# Patient Record
Sex: FEMALE | Race: WHITE | NOT HISPANIC OR LATINO
[De-identification: names, ages, dates, MRNs, and addresses within clinical notes are randomized per-mention and may not be internally consistent; named-entity substitution may affect disease eponyms.]

---

## 2018-05-16 ENCOUNTER — RESULT REVIEW (OUTPATIENT)
Age: 43
End: 2018-05-16

## 2018-05-23 ENCOUNTER — ASOB RESULT (OUTPATIENT)
Age: 43
End: 2018-05-23

## 2018-05-23 ENCOUNTER — APPOINTMENT (OUTPATIENT)
Dept: ANTEPARTUM | Facility: CLINIC | Age: 43
End: 2018-05-23
Payer: COMMERCIAL

## 2018-05-23 PROCEDURE — 76857 US EXAM PELVIC LIMITED: CPT | Mod: 59

## 2018-05-23 PROCEDURE — 76830 TRANSVAGINAL US NON-OB: CPT

## 2020-08-12 ENCOUNTER — RESULT REVIEW (OUTPATIENT)
Age: 45
End: 2020-08-12

## 2020-08-19 ENCOUNTER — APPOINTMENT (OUTPATIENT)
Dept: DERMATOLOGY | Facility: CLINIC | Age: 45
End: 2020-08-19
Payer: COMMERCIAL

## 2020-08-19 DIAGNOSIS — L63.9 ALOPECIA AREATA, UNSPECIFIED: ICD-10-CM

## 2020-08-19 PROCEDURE — 99203 OFFICE O/P NEW LOW 30 MIN: CPT

## 2020-08-19 RX ORDER — LEVOTHYROXINE SODIUM 137 UG/1
TABLET ORAL
Refills: 0 | Status: ACTIVE | COMMUNITY

## 2020-08-19 NOTE — HISTORY OF PRESENT ILLNESS
[FreeTextEntry1] : Hair loss [de-identified] : First visit for 45 year old white female presenting with a two-week history of marked hair loss on the crown of the scalp. No previous episodes. No previous treatment.\par Note: Patient with history of hypothyroidism on Synthroid.

## 2020-08-19 NOTE — PHYSICAL EXAM
[Oriented x 3] : ~L oriented x 3 [Alert] : alert [Well Nourished] : well nourished [FreeTextEntry3] : Type II skin\par \par Patient wearing a facemask\par \par Crown of scalp: 5 x 4 cm focus of almost total care loss\par Smaller similar areas present on the right crown of scalp, left parietal scalp\par Multiple broken hairs seen on dermoscopy\par One small exclamation point hairs\par Very positive hair pull\par \par Fingernails without pitting

## 2020-09-23 ENCOUNTER — APPOINTMENT (OUTPATIENT)
Dept: DERMATOLOGY | Facility: CLINIC | Age: 45
End: 2020-09-23

## 2023-04-03 ENCOUNTER — APPOINTMENT (OUTPATIENT)
Dept: FAMILY MEDICINE | Facility: CLINIC | Age: 48
End: 2023-04-03
Payer: COMMERCIAL

## 2023-04-03 ENCOUNTER — NON-APPOINTMENT (OUTPATIENT)
Age: 48
End: 2023-04-03

## 2023-04-03 ENCOUNTER — APPOINTMENT (OUTPATIENT)
Dept: ORTHOPEDIC SURGERY | Facility: CLINIC | Age: 48
End: 2023-04-03
Payer: OTHER MISCELLANEOUS

## 2023-04-03 VITALS
HEART RATE: 80 BPM | WEIGHT: 130 LBS | BODY MASS INDEX: 23.92 KG/M2 | TEMPERATURE: 98.8 F | HEIGHT: 62 IN | SYSTOLIC BLOOD PRESSURE: 130 MMHG | DIASTOLIC BLOOD PRESSURE: 60 MMHG | OXYGEN SATURATION: 95 %

## 2023-04-03 VITALS — HEIGHT: 62 IN | WEIGHT: 130 LBS | BODY MASS INDEX: 23.92 KG/M2

## 2023-04-03 DIAGNOSIS — Z00.00 ENCOUNTER FOR GENERAL ADULT MEDICAL EXAMINATION W/OUT ABNORMAL FINDINGS: ICD-10-CM

## 2023-04-03 DIAGNOSIS — E03.9 HYPOTHYROIDISM, UNSPECIFIED: ICD-10-CM

## 2023-04-03 DIAGNOSIS — Z78.9 OTHER SPECIFIED HEALTH STATUS: ICD-10-CM

## 2023-04-03 DIAGNOSIS — S92.902A UNSPECIFIED FRACTURE OF LEFT FOOT, INITIAL ENCOUNTER FOR CLOSED FRACTURE: ICD-10-CM

## 2023-04-03 DIAGNOSIS — Z83.49 FAMILY HISTORY OF OTHER ENDOCRINE, NUTRITIONAL AND METABOLIC DISEASES: ICD-10-CM

## 2023-04-03 LAB
BILIRUB UR QL STRIP: NORMAL
CLARITY UR: CLEAR
COLLECTION METHOD: NORMAL
GLUCOSE UR-MCNC: NORMAL
HCG UR QL: 0.2 EU/DL
HGB UR QL STRIP.AUTO: NORMAL
KETONES UR-MCNC: NORMAL
LEUKOCYTE ESTERASE UR QL STRIP: NORMAL
NITRITE UR QL STRIP: NORMAL
PH UR STRIP: 6.5
PROT UR STRIP-MCNC: NORMAL
SP GR UR STRIP: 1

## 2023-04-03 PROCEDURE — 92552 PURE TONE AUDIOMETRY AIR: CPT

## 2023-04-03 PROCEDURE — 93000 ELECTROCARDIOGRAM COMPLETE: CPT

## 2023-04-03 PROCEDURE — 73630 X-RAY EXAM OF FOOT: CPT | Mod: LT

## 2023-04-03 PROCEDURE — 99204 OFFICE O/P NEW MOD 45 MIN: CPT

## 2023-04-03 PROCEDURE — 99386 PREV VISIT NEW AGE 40-64: CPT | Mod: 25

## 2023-04-03 PROCEDURE — 81003 URINALYSIS AUTO W/O SCOPE: CPT | Mod: QW

## 2023-04-03 PROCEDURE — 36415 COLL VENOUS BLD VENIPUNCTURE: CPT

## 2023-04-03 NOTE — PHYSICAL EXAM
[No Acute Distress] : no acute distress [Well Nourished] : well nourished [Well Developed] : well developed [Well-Appearing] : well-appearing [Normal Sclera/Conjunctiva] : normal sclera/conjunctiva [PERRL] : pupils equal round and reactive to light [EOMI] : extraocular movements intact [Normal Outer Ear/Nose] : the outer ears and nose were normal in appearance [Normal Oropharynx] : the oropharynx was normal [No JVD] : no jugular venous distention [No Lymphadenopathy] : no lymphadenopathy [Supple] : supple [Thyroid Normal, No Nodules] : the thyroid was normal and there were no nodules present [No Respiratory Distress] : no respiratory distress  [No Accessory Muscle Use] : no accessory muscle use [Clear to Auscultation] : lungs were clear to auscultation bilaterally [Normal Rate] : normal rate  [Regular Rhythm] : with a regular rhythm [Normal S1, S2] : normal S1 and S2 [No Murmur] : no murmur heard [No Carotid Bruits] : no carotid bruits [No Abdominal Bruit] : a ~M bruit was not heard ~T in the abdomen [No Varicosities] : no varicosities [Pedal Pulses Present] : the pedal pulses are present [No Edema] : there was no peripheral edema [No Palpable Aorta] : no palpable aorta [No Extremity Clubbing/Cyanosis] : no extremity clubbing/cyanosis [Soft] : abdomen soft [Non Tender] : non-tender [Non-distended] : non-distended [No Masses] : no abdominal mass palpated [No HSM] : no HSM [Normal Bowel Sounds] : normal bowel sounds [Normal Posterior Cervical Nodes] : no posterior cervical lymphadenopathy [Normal Anterior Cervical Nodes] : no anterior cervical lymphadenopathy [No CVA Tenderness] : no CVA  tenderness [No Spinal Tenderness] : no spinal tenderness [No Joint Swelling] : no joint swelling [Grossly Normal Strength/Tone] : grossly normal strength/tone [No Rash] : no rash [Coordination Grossly Intact] : coordination grossly intact [No Focal Deficits] : no focal deficits [Normal Gait] : normal gait [Deep Tendon Reflexes (DTR)] : deep tendon reflexes were 2+ and symmetric [Normal Affect] : the affect was normal [Normal Insight/Judgement] : insight and judgment were intact [FreeTextEntry1] : .\par Right Ear\par \par 0.5-35\par 1-20\par 2-20\par 4-20\par \par Left Ear\par \par 0.5-25\par 1-25\par 2-20\par 4-20\par

## 2023-04-03 NOTE — DISCUSSION/SUMMARY
[de-identified] : Today I had a lengthy discussion with the patient regarding their left Metatarsal fracture/pain. I have addressed all the patient's concerns surrounding the pathology of their condition. XR imaging obtained previously at an urgent care was reviewed and discussed in detail  in office today. MRI imaging was discussed and interpreted by me today in office. Discussed with the patient conservative and surgical treatment options. I recommend that the patient utilize 50,000 units of vitamin D. A prescription was given in the office today. I recommended the patient utilize bone stimulator treatment and discussed all associated risks in detail. I encouraged the patient to quit smoking and educated them about the risks that smoking has on bone healing. A discussion was had about utilizing custom orthotics. The patient was educated about custom orthotics in the office today. I recommend that the patient utilize ice, NSAIDS PRN, and heat. They can also elevate their left foot above the level of the heart. \par \par \par I recommend the patient follow up 4-6 weeks to reassess their condition. \par \par The patient understood and verbally agreed to the treatment plan. All of their questions were answered and they were satisfied with the visit. The patient should call the office if they have any questions or experience worsening symptoms.

## 2023-04-03 NOTE — PLAN
[FreeTextEntry1] : Blood work done in office today. Will follow up on results with patient.\par Extensive counseling provided on lifestyle modifications (healthy diet, daily exercise, routine screenings). \par Return for CPE in 1 year.\par \par DEXA 2 weeks prior \par Mammogram recently done 3/22/23, followed by biopsy - DCIS - scheduled to see breast onc end of 4/23 with [facility] in [place]. \par \par GI referral given for colonoscopy. \par Sees derm routinely.

## 2023-04-03 NOTE — HISTORY OF PRESENT ILLNESS
[FreeTextEntry1] : Patient is a 47 year old, right hand dominant female who presents in office status post work related injury on 06/06/2022. The patient works as a Teacher in [employer]. During the injury the patient injured her left foot. The patient states that she was at field day and was dancing with her class on back-top, stepped down and felt a pop thorough out the lateral side of her left foot. She initially went to urgent care and followed up with [facility]. The patient states that she went throughout conservative treatment (physical therapy, CAM boot) with feeling minimal relief. She is here seeking a second opinion. She endorses lateral foot, big toe, 2nd and 3rd toe pain. She does experiences some numbness into all of her toes. She presents today jalil HOKA sneakers and is ambulating without assistance. No other complaints.\par \par Pain scale: 6/10\par \par Working: The patient is currently working\par Driving: The patient is currently driving\par \par Activities that make the pain better:\par Rest\par Ice\par Heat\par \par Activities that make pain worse:\par ADL's\par Lifting heavy objects\par Chores\par Work\par Sleeping\par Stairs\par Seating to standing position\par Walking\par

## 2023-04-03 NOTE — HEALTH RISK ASSESSMENT
[Yes] : Yes [2 - 4 times a month (2 pts)] : 2-4 times a month (2 points) [1 or 2 (0 pts)] : 1 or 2 (0 points) [Never (0 pts)] : Never (0 points) [No] : In the past 12 months have you used drugs other than those required for medical reasons? No [No falls in past year] : Patient reported no falls in the past year [0] : 2) Feeling down, depressed, or hopeless: Not at all (0) [PHQ-2 Negative - No further assessment needed] : PHQ-2 Negative - No further assessment needed [Patient reported mammogram was abnormal] : Patient reported mammogram was abnormal [Patient reported PAP Smear was normal] : Patient reported PAP Smear was normal [HIV test declined] : HIV test declined [Hepatitis C test declined] : Hepatitis C test declined [With Family] : lives with family [# of Members in Household ___] :  household currently consist of [unfilled] member(s) [Employed] : employed [College] : College [] :  [# Of Children ___] : has [unfilled] children [Sexually Active] : sexually active [Feels Safe at Home] : Feels safe at home [Fully functional (bathing, dressing, toileting, transferring, walking, feeding)] : Fully functional (bathing, dressing, toileting, transferring, walking, feeding) [Fully functional (using the telephone, shopping, preparing meals, housekeeping, doing laundry, using] : Fully functional and needs no help or supervision to perform IADLs (using the telephone, shopping, preparing meals, housekeeping, doing laundry, using transportation, managing medications and managing finances) [Reports changes in vision] : Reports changes in vision [Reports normal functional visual acuity (ie: able to read med bottle)] : Reports normal functional visual acuity [Smoke Detector] : smoke detector [Carbon Monoxide Detector] : carbon monoxide detector [Safety elements used in home] : safety elements used in home [Seat Belt] :  uses seat belt [Sunscreen] : uses sunscreen [Audit-CScore] : 2 [[id number]] : 0 [Change in mental status noted] : No change in mental status noted [Language] : denies difficulty with language [Behavior] : denies difficulty with behavior [Learning/Retaining New Information] : denies difficulty learning/retaining new information [Handling Complex Tasks] : denies difficulty handling complex tasks [Reasoning] : denies difficulty with reasoning [Spatial Ability and Orientation] : denies difficulty with spatial ability and orientation [Reports changes in hearing] : Reports no changes in hearing [Reports changes in dental health] : Reports no changes in dental health [Guns at Home] : no guns at home [Travel to Developing Areas] : does not  travel to developing areas [TB Exposure] : is not being exposed to tuberculosis [Caregiver Concerns] : does not have caregiver concerns [MammogramDate] : 03/23 [PapSmearDate] : 02/22 [FreeTextEntry2] : teacher

## 2023-04-03 NOTE — ADDENDUM
[FreeTextEntry1] : I, MILLI HAYS, acted solely as a scribe for Dr. Jcarlos Watson on this date 04/03/2023  .\par  \par All medical record entries made by the Scribe were at my, Dr. Jcarlos Watson, direction and personally dictated by me on 04/03/2023 . I have reviewed the chart and agree that the record accurately reflects my personal performance of the history, physical exam, assessment and plan. I have also personally directed, reviewed, and agreed with the chart.

## 2023-04-03 NOTE — PHYSICAL EXAM
[de-identified] : Left Foot Exam\par \par Skin: Clean, dry, intact\par Inspection: No obvious malalignment, no masses, no swelling, no effusion\par Pulses: 2+ DP/PT pulses\par ROM: LEFT FOOT ROM of 1st digit : 50 degrees of dorsiflexion, 40 degrees of plantarflexion\par Painful ROM: None\par Tenderness: + Base of the fifth metatarsal tenderness, + Tenderness to 1st MTP joint, No tenderness over the medial malleolus, No tenderness over the lateral malleolus, no CFL/ATFL/PTFL pain, no deltoid ligament pain. No heel pain. No Achilles tenderness. No pain to the LisFranc joint. No ttp over the posterior tibial tendon.\par Stability: Negative anterior/posterior drawer.\par Strength: 5/5 ADD/ABD/TA/GS/EHL/FHL/EDL\par Neuro: Sensation in tact to light touch throughout\par Additional tests: Negative Mortons test, negative tarsal tunnel tinels, negative single heel rise.  [de-identified] : MRI-LEFT FOOT NON CONTRAST\par \par HISTORY: Left foot pain\par \par TECHNIQUE: MR imaging of the left foot was performed without IV contrast on a\par 3.0 Francine Ultra High Field Wide Bore MRI unit utilizing the following pulse\par sequences: Short axis proton density with and without fat suppression, long axis\par proton density fat-suppressed, and sagittal proton density.\par \par COMPARISON: No prior studies are available for comparison..\par \par FINDINGS:\par \par Bones/joints: There is bone marrow edema in the first metatarsal head with\par subcortical cysts likely residual previous microtrabecular fracture... There is\par bone marrow edema in the junction of the proximal shaft and base of the fifth\par metatarsal suggestive of a stress reaction and may represent residua of previous\par fracture. Correlate with clinical exam and consider CT for further assessment..\par \par Tendons/muscles: The flexor and extensor tendons are intact without evidence for\par tendinosis, tear, or tenosynovitis. There is no disproportionate muscle atrophy\par or intramuscular edema.\par The visualized portions of the plantar fascia are intact.\par \par Ligaments: The Lisfranc ligament is intact. There is no acute full-thickness\par plantar plate tear.\par \par Webspaces: No webspace neuroma or intermetatarsal bursitis is appreciated.\par \par Subcutaneous tissues: The subcutaneous tissues are within normal limits.\par \par IMPRESSION:\par \par 1. Bone marrow edema in the first metatarsal head with subcortical cysts likely\par residua of previous microtrabecular fracture.\par 2. Bone marrow edema in the junction of the proximal shaft and base of the\par fifth metatarsal suggestive of a stress reaction and may represent residua of\par previous fracture. Correlate with clinical exam and consider CT for further\par assessment.\par \par Signed by: Rafael Villafuerte MD\par Signed Date: 3/23/2023 11:55 AM EDT\par \par \par \par SIGNED BY: Rafael Villafuerte M.D., Ext. [phone] 03/23/2023 11:55 AM

## 2023-04-06 ENCOUNTER — TRANSCRIPTION ENCOUNTER (OUTPATIENT)
Age: 48
End: 2023-04-06

## 2023-04-07 ENCOUNTER — TRANSCRIPTION ENCOUNTER (OUTPATIENT)
Age: 48
End: 2023-04-07

## 2023-05-01 ENCOUNTER — APPOINTMENT (OUTPATIENT)
Dept: PLASTIC SURGERY | Facility: CLINIC | Age: 48
End: 2023-05-01
Payer: COMMERCIAL

## 2023-05-01 VITALS
WEIGHT: 132 LBS | DIASTOLIC BLOOD PRESSURE: 82 MMHG | HEIGHT: 62 IN | BODY MASS INDEX: 24.29 KG/M2 | HEART RATE: 73 BPM | SYSTOLIC BLOOD PRESSURE: 140 MMHG | OXYGEN SATURATION: 97 % | RESPIRATION RATE: 17 BRPM

## 2023-05-01 DIAGNOSIS — Z42.1 ENCOUNTER FOR BREAST RECONSTRUCTION FOLLOWING MASTECTOMY: ICD-10-CM

## 2023-05-01 DIAGNOSIS — F17.210 NICOTINE DEPENDENCE, CIGARETTES, UNCOMPLICATED: ICD-10-CM

## 2023-05-01 DIAGNOSIS — Z82.49 FAMILY HISTORY OF ISCHEMIC HEART DISEASE AND OTHER DISEASES OF THE CIRCULATORY SYSTEM: ICD-10-CM

## 2023-05-01 DIAGNOSIS — Z80.8 FAMILY HISTORY OF MALIGNANT NEOPLASM OF OTHER ORGANS OR SYSTEMS: ICD-10-CM

## 2023-05-01 PROCEDURE — 99205 OFFICE O/P NEW HI 60 MIN: CPT

## 2023-05-01 RX ORDER — PREDNISONE 10 MG/1
10 TABLET ORAL
Qty: 70 | Refills: 0 | Status: DISCONTINUED | COMMUNITY
Start: 2020-08-19 | End: 2023-05-01

## 2023-05-10 ENCOUNTER — APPOINTMENT (OUTPATIENT)
Dept: RADIATION ONCOLOGY | Facility: CLINIC | Age: 48
End: 2023-05-10
Payer: COMMERCIAL

## 2023-05-10 ENCOUNTER — NON-APPOINTMENT (OUTPATIENT)
Age: 48
End: 2023-05-10

## 2023-05-10 VITALS
DIASTOLIC BLOOD PRESSURE: 74 MMHG | BODY MASS INDEX: 24.11 KG/M2 | RESPIRATION RATE: 18 BRPM | HEART RATE: 69 BPM | HEIGHT: 62 IN | WEIGHT: 131 LBS | SYSTOLIC BLOOD PRESSURE: 123 MMHG

## 2023-05-10 DIAGNOSIS — Z80.8 FAMILY HISTORY OF MALIGNANT NEOPLASM OF OTHER ORGANS OR SYSTEMS: ICD-10-CM

## 2023-05-10 DIAGNOSIS — Z86.39 PERSONAL HISTORY OF OTHER ENDOCRINE, NUTRITIONAL AND METABOLIC DISEASE: ICD-10-CM

## 2023-05-10 PROCEDURE — 99204 OFFICE O/P NEW MOD 45 MIN: CPT

## 2023-05-10 NOTE — OB/GYN HISTORY
[History of Birth Control Pills] : Patient has a history of taking birth control pills [Menopause Age: ____] : patient was [unfilled] years old at menopause [___] : Living: [unfilled]

## 2023-05-10 NOTE — REVIEW OF SYSTEMS
[Insomnia] : insomnia [Anxiety] : anxiety [Negative] : Allergic/Immunologic [FreeTextEntry9] : foot pain from healed fracture

## 2023-05-10 NOTE — VITALS
[Maximal Pain Intensity: 0/10] : 0/10 [NoTreatment Scheduled] : no treatment scheduled [90: Able to carry normal activity; minor signs or symptoms of disease.] : 90: Able to carry normal activity; minor signs or symptoms of disease.  [Date: ____________] : Patient's last distress assessment performed on [unfilled]. [8 - Distress Level] : Distress Level: 8 [Referred Patient  to social work for follow-up] : Patient was referred to social work for follow-up [Patient given social work contact information and resource sheet] : Patient was given social work contact information and resource sheet

## 2023-05-10 NOTE — PHYSICAL EXAM
[Symmetric] : breasts are symmetric [Breast Palpation Mass] : no palpable masses [No UE Edema] : there is no upper extremity edema [Normal] : oriented to person, place and time, the affect was normal, the mood was normal and not anxious [de-identified] : c cups. ecchymosis L lateral breast

## 2023-05-10 NOTE — HISTORY OF PRESENT ILLNESS
[FreeTextEntry1] : The patient is a pleasant 47 year old female diagnosed with DCIS of the left breast referred by Dr. Fontaine.\par She had an abnormal routine screening mammogram which revealed heterogeneously dense breast, right breast without suspicious findings. Left breast- group of calcifications in the posterior upper left breast, 7.3 cm FN. Additional mammographic views of the left breast obtained the same day, demonstrate a mixture of calcifications with coarse heterogeneous and pleomorphic morphology, measuring 0.7 x 0.5 x 0.7 cm.\par 3/28/23 stereotactic biopsy of calcifications in the posterior upper outer left breast. 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  LCIS classic type,  ADH and ALH present.  %, IL 70%positive.\par \par Left partial mastectomy is scheduled for 5/12/23 with Dr. Fontaine.\par [employer] negative on 4/12/23.She works full time as a  in [employer]. She has 2 sons ages 18 and 16. She exercises regularly with strenght training (beach body).\par She presents to discuss therapy options.

## 2023-05-15 ENCOUNTER — APPOINTMENT (OUTPATIENT)
Dept: ORTHOPEDIC SURGERY | Facility: CLINIC | Age: 48
End: 2023-05-15
Payer: OTHER MISCELLANEOUS

## 2023-05-15 ENCOUNTER — FORM ENCOUNTER (OUTPATIENT)
Age: 48
End: 2023-05-15

## 2023-05-15 PROCEDURE — 73630 X-RAY EXAM OF FOOT: CPT | Mod: LT

## 2023-05-15 PROCEDURE — 99213 OFFICE O/P EST LOW 20 MIN: CPT

## 2023-05-15 NOTE — DISCUSSION/SUMMARY
[de-identified] : Today I had a lengthy discussion with the patient regarding their left Metatarsal fracture/pain. I have addressed all the patient's concerns surrounding the pathology of their condition. XR imaging obtained today was reviewed and discussed in detail  in office today. Discussed with the patient conservative treatment options. I recommend the patient undergo a course of physical therapy for the X  2-3 times a week for a total of 6-8 weeks. A prescription was given for the physical therapy today.  I recommend that the patient to utilize 50,000 units of vitamin D. I recommend that the patient utilize Voltaren gel topically. If the Voltaren gel could not be obtained, Icy Hot, Biofreeze, or Bengay can be utilized instead.  I encouraged the patient to quit smoking and educated them about the risks that smoking has on bone healing. I recommend that the patient utilize a stiff shoe. The patient was provided with the stiff shoe in the office today.  I recommend that the patient utilize ice, NSAIDS PRN, and heat. They can also elevate their left foot above the level of the heart. \par \par \par I recommend the patient follow up 4-6 weeks to reassess their condition.  If there is on resolution of symptoms upon the next visit than an MRI may be necessary. \par \par The patient understood and verbally agreed to the treatment plan. All of their questions were answered and they were satisfied with the visit. The patient should call the office if they have any questions or experience worsening symptoms. \par \par She is currently full time working without limitations.

## 2023-05-15 NOTE — ADDENDUM
[FreeTextEntry1] : I, MILLI HAYS, acted solely as a scribe for Dr. Jcarlos Watson on this date 05/15/2023  .\par  \par All medical record entries made by the Scribe were at my, Dr. Jcarlos Watson, direction and personally dictated by me on 05/15/2023 . I have reviewed the chart and agree that the record accurately reflects my personal performance of the history, physical exam, assessment and plan. I have also personally directed, reviewed, and agreed with the chart.

## 2023-05-15 NOTE — PHYSICAL EXAM
[de-identified] : Left Foot Exam\par \par Skin: Clean, dry, intact\par Inspection: No obvious malalignment, no masses, no swelling, no effusion\par Pulses: 2+ DP/PT pulses\par ROM: LEFT FOOT ROM of 1st digit : 50 degrees of dorsiflexion, 40 degrees of plantarflexion\par Painful ROM: None\par Tenderness: + lateral pain/burning, + Base of the fifth metatarsal tenderness, + Tenderness to 1st MTP joint, No tenderness over the medial malleolus, No tenderness over the lateral malleolus, no CFL/ATFL/PTFL pain, no deltoid ligament pain. No heel pain. No Achilles tenderness. No pain to the LisFranc joint. No ttp over the posterior tibial tendon.\par Stability: Negative anterior/posterior drawer.\par Strength: 5/5 ADD/ABD/TA/GS/EHL/FHL/EDL\par Neuro: Sensation in tact to light touch throughout\par Additional tests: Negative Mortons test, negative tarsal tunnel tinels, negative single heel rise.  [de-identified] : 3V of the left foot were ordered obtained and reviewed by me today, 05/15/2023 , revealed: Concern for stress fracture to base of fifth Metatarsal.

## 2023-05-15 NOTE — HISTORY OF PRESENT ILLNESS
[FreeTextEntry1] : 05/15/23: Patient is a 47 year old, right hand dominant female who presents in office for follow up evaluation of work related injury. She reports that her pain has remained the same since the last visit. She describes the pain as a constant burning sensation to the lateral plantar aspect of her left foot. She states that she was not able to utilize the orthotics and the bone stimulator. She presents today wearing sneakers and is ambulating without assistance. She reports that she had a bone density scan in march which did not show any abnormality. \par \par 4/03/23:Patient is a 47 year old, right hand dominant female who presents in office status post work related injury on 06/06/2022. 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 The patient states that she was at field day and was dancing with her class on back-top, stepped down and felt a pop thorough out the lateral side of her left foot. She initially went to urgent care and followed up with [facility]. The patient states that she went throughout conservative treatment (physical therapy, CAM boot) with feeling minimal relief. She is here seeking a second opinion. She endorses lateral foot, big toe, 2nd and 3rd toe pain. She does experiences some numbness into all of her toes. She presents today jalil HOKA sneakers and is ambulating without assistance. No other complaints.\par \par Pain scale: 6/10\par \par Working: The patient is currently working\par Driving: The patient is currently driving\par \par Activities that make the pain better:\par Rest\par Ice\par Heat\par \par Activities that make pain worse:\par ADL's\par Lifting heavy objects\par Chores\par Work\par Sleeping\par Stairs\par Seating to standing position\par Walking\par

## 2023-05-25 RX ORDER — FENTANYL CITRATE 50 UG/ML
50 INJECTION INTRAVENOUS
Refills: 0 | Status: DISCONTINUED | OUTPATIENT
Start: 2023-05-26 | End: 2023-05-26

## 2023-05-25 RX ORDER — SODIUM CHLORIDE 9 MG/ML
1000 INJECTION, SOLUTION INTRAVENOUS
Refills: 0 | Status: DISCONTINUED | OUTPATIENT
Start: 2023-05-26 | End: 2023-05-26

## 2023-05-25 RX ORDER — ONDANSETRON 8 MG/1
4 TABLET, FILM COATED ORAL ONCE
Refills: 0 | Status: DISCONTINUED | OUTPATIENT
Start: 2023-05-26 | End: 2023-05-26

## 2023-05-26 ENCOUNTER — TRANSCRIPTION ENCOUNTER (OUTPATIENT)
Age: 48
End: 2023-05-26

## 2023-05-26 ENCOUNTER — OUTPATIENT (OUTPATIENT)
Dept: INPATIENT UNIT | Facility: HOSPITAL | Age: 48
LOS: 1 days | Discharge: HOME | End: 2023-05-26
Payer: COMMERCIAL

## 2023-05-26 VITALS
OXYGEN SATURATION: 97 % | TEMPERATURE: 98 F | RESPIRATION RATE: 16 BRPM | HEART RATE: 65 BPM | SYSTOLIC BLOOD PRESSURE: 113 MMHG | WEIGHT: 130.07 LBS | DIASTOLIC BLOOD PRESSURE: 65 MMHG | HEIGHT: 62 IN

## 2023-05-26 VITALS
OXYGEN SATURATION: 98 % | DIASTOLIC BLOOD PRESSURE: 76 MMHG | SYSTOLIC BLOOD PRESSURE: 126 MMHG | TEMPERATURE: 97 F | HEART RATE: 60 BPM | RESPIRATION RATE: 18 BRPM

## 2023-05-26 DIAGNOSIS — D05.12 INTRADUCTAL CARCINOMA IN SITU OF LEFT BREAST: ICD-10-CM

## 2023-05-26 DIAGNOSIS — C50.919 MALIGNANT NEOPLASM OF UNSPECIFIED SITE OF UNSPECIFIED FEMALE BREAST: ICD-10-CM

## 2023-05-26 DIAGNOSIS — Z98.891 HISTORY OF UTERINE SCAR FROM PREVIOUS SURGERY: Chronic | ICD-10-CM

## 2023-05-26 DIAGNOSIS — Z98.890 OTHER SPECIFIED POSTPROCEDURAL STATES: Chronic | ICD-10-CM

## 2023-05-26 DIAGNOSIS — Z90.49 ACQUIRED ABSENCE OF OTHER SPECIFIED PARTS OF DIGESTIVE TRACT: Chronic | ICD-10-CM

## 2023-05-26 PROCEDURE — 88307 TISSUE EXAM BY PATHOLOGIST: CPT | Mod: 26

## 2023-05-26 PROCEDURE — A9520: CPT

## 2023-05-26 PROCEDURE — 88307 TISSUE EXAM BY PATHOLOGIST: CPT

## 2023-05-26 RX ORDER — OXYCODONE AND ACETAMINOPHEN 5; 325 MG/1; MG/1
1 TABLET ORAL EVERY 4 HOURS
Refills: 0 | Status: DISCONTINUED | OUTPATIENT
Start: 2023-05-26 | End: 2023-05-26

## 2023-05-26 RX ORDER — LEVOTHYROXINE SODIUM 125 MCG
1 TABLET ORAL
Refills: 0 | DISCHARGE

## 2023-05-26 RX ORDER — ERGOCALCIFEROL 1.25 MG/1
0 CAPSULE ORAL
Refills: 0 | DISCHARGE

## 2023-05-26 RX ORDER — OXYCODONE AND ACETAMINOPHEN 5; 325 MG/1; MG/1
1 TABLET ORAL
Qty: 0 | Refills: 0 | DISCHARGE
Start: 2023-05-26

## 2023-05-26 RX ORDER — OXYCODONE HYDROCHLORIDE 5 MG/1
5 TABLET ORAL ONCE
Refills: 0 | Status: DISCONTINUED | OUTPATIENT
Start: 2023-05-26 | End: 2023-05-26

## 2023-05-26 RX ADMIN — OXYCODONE HYDROCHLORIDE 5 MILLIGRAM(S): 5 TABLET ORAL at 08:58

## 2023-05-26 NOTE — ASU PATIENT PROFILE, ADULT - NSICDXPASTSURGICALHX_GEN_ALL_CORE_FT
PAST SURGICAL HISTORY:  H/O  section     H/O lumpectomy left    H/O:  section one    History of cholecystectomy

## 2023-05-30 ENCOUNTER — APPOINTMENT (OUTPATIENT)
Dept: MRI IMAGING | Facility: CLINIC | Age: 48
End: 2023-05-30
Payer: COMMERCIAL

## 2023-05-30 ENCOUNTER — OUTPATIENT (OUTPATIENT)
Dept: OUTPATIENT SERVICES | Facility: HOSPITAL | Age: 48
LOS: 1 days | End: 2023-05-30
Payer: COMMERCIAL

## 2023-05-30 DIAGNOSIS — Z98.890 OTHER SPECIFIED POSTPROCEDURAL STATES: Chronic | ICD-10-CM

## 2023-05-30 DIAGNOSIS — Z42.1 ENCOUNTER FOR BREAST RECONSTRUCTION FOLLOWING MASTECTOMY: ICD-10-CM

## 2023-05-30 DIAGNOSIS — Z98.891 HISTORY OF UTERINE SCAR FROM PREVIOUS SURGERY: Chronic | ICD-10-CM

## 2023-05-30 DIAGNOSIS — Z90.49 ACQUIRED ABSENCE OF OTHER SPECIFIED PARTS OF DIGESTIVE TRACT: Chronic | ICD-10-CM

## 2023-05-30 DIAGNOSIS — D05.12 INTRADUCTAL CARCINOMA IN SITU OF LEFT BREAST: ICD-10-CM

## 2023-05-30 PROBLEM — C50.919 MALIGNANT NEOPLASM OF UNSPECIFIED SITE OF UNSPECIFIED FEMALE BREAST: Chronic | Status: ACTIVE | Noted: 2023-05-26

## 2023-05-30 PROBLEM — E03.9 HYPOTHYROIDISM, UNSPECIFIED: Chronic | Status: ACTIVE | Noted: 2023-05-26

## 2023-05-30 PROBLEM — O24.419 GESTATIONAL DIABETES MELLITUS IN PREGNANCY, UNSPECIFIED CONTROL: Chronic | Status: ACTIVE | Noted: 2023-05-26

## 2023-05-30 PROCEDURE — 74185 MRA ABD W OR W/O CNTRST: CPT | Mod: 26

## 2023-05-30 PROCEDURE — C8920: CPT

## 2023-05-30 PROCEDURE — 72198 MR ANGIO PELVIS W/O & W/DYE: CPT | Mod: 26

## 2023-05-30 PROCEDURE — C8902: CPT

## 2023-05-31 ENCOUNTER — APPOINTMENT (OUTPATIENT)
Dept: FAMILY MEDICINE | Facility: CLINIC | Age: 48
End: 2023-05-31
Payer: COMMERCIAL

## 2023-05-31 VITALS
SYSTOLIC BLOOD PRESSURE: 100 MMHG | HEIGHT: 62 IN | DIASTOLIC BLOOD PRESSURE: 60 MMHG | HEART RATE: 58 BPM | BODY MASS INDEX: 24.11 KG/M2 | TEMPERATURE: 97.9 F | WEIGHT: 131 LBS | OXYGEN SATURATION: 97 %

## 2023-05-31 DIAGNOSIS — F41.9 ANXIETY DISORDER, UNSPECIFIED: ICD-10-CM

## 2023-05-31 DIAGNOSIS — C50.912 MALIGNANT NEOPLASM OF UNSPECIFIED SITE OF LEFT FEMALE BREAST: ICD-10-CM

## 2023-05-31 DIAGNOSIS — Z87.891 PERSONAL HISTORY OF NICOTINE DEPENDENCE: ICD-10-CM

## 2023-05-31 DIAGNOSIS — E11.9 TYPE 2 DIABETES MELLITUS WITHOUT COMPLICATIONS: ICD-10-CM

## 2023-05-31 DIAGNOSIS — E55.9 VITAMIN D DEFICIENCY, UNSPECIFIED: ICD-10-CM

## 2023-05-31 DIAGNOSIS — E03.9 HYPOTHYROIDISM, UNSPECIFIED: ICD-10-CM

## 2023-05-31 DIAGNOSIS — Z91.018 ALLERGY TO OTHER FOODS: ICD-10-CM

## 2023-05-31 LAB — SURGICAL PATHOLOGY STUDY: SIGNIFICANT CHANGE UP

## 2023-05-31 PROCEDURE — 99214 OFFICE O/P EST MOD 30 MIN: CPT

## 2023-05-31 NOTE — HISTORY OF PRESENT ILLNESS
[FreeTextEntry8] : ABHIJEET PAULA is a 47 year old female presenting for blood work consult. \par \par Recently diagnosed with breast cancer.  Has seen radiation oncology (Dr. Navarro) as well as oncology at [facility].  Status post lumpectomy.\par He is due to follow-up with specialist for management.

## 2023-05-31 NOTE — ASSESSMENT
[FreeTextEntry1] : Reviewed specialty consult notes since last visit. \par Reviewed with patient her most recent blood work results.\par Reviewed recent imaging. \par Patient is due to follow-up for management.  Dr. Emilee Jacobo's information was provided to patient to schedule an appointment.

## 2023-06-05 ENCOUNTER — NON-APPOINTMENT (OUTPATIENT)
Age: 48
End: 2023-06-05

## 2023-06-05 ENCOUNTER — OUTPATIENT (OUTPATIENT)
Dept: OUTPATIENT SERVICES | Facility: HOSPITAL | Age: 48
LOS: 1 days | Discharge: HOME | End: 2023-06-05
Payer: COMMERCIAL

## 2023-06-05 DIAGNOSIS — Z98.891 HISTORY OF UTERINE SCAR FROM PREVIOUS SURGERY: Chronic | ICD-10-CM

## 2023-06-05 DIAGNOSIS — C50.919 MALIGNANT NEOPLASM OF UNSPECIFIED SITE OF UNSPECIFIED FEMALE BREAST: ICD-10-CM

## 2023-06-05 DIAGNOSIS — Z98.890 OTHER SPECIFIED POSTPROCEDURAL STATES: Chronic | ICD-10-CM

## 2023-06-05 DIAGNOSIS — Z90.49 ACQUIRED ABSENCE OF OTHER SPECIFIED PARTS OF DIGESTIVE TRACT: Chronic | ICD-10-CM

## 2023-06-06 ENCOUNTER — RESULT REVIEW (OUTPATIENT)
Age: 48
End: 2023-06-06

## 2023-06-06 ENCOUNTER — APPOINTMENT (OUTPATIENT)
Dept: HEMATOLOGY ONCOLOGY | Facility: CLINIC | Age: 48
End: 2023-06-06
Payer: COMMERCIAL

## 2023-06-06 DIAGNOSIS — D05.12 INTRADUCTAL CARCINOMA IN SITU OF LEFT BREAST: ICD-10-CM

## 2023-06-06 LAB
BASOPHILS # BLD AUTO: 0.04 K/UL — SIGNIFICANT CHANGE UP (ref 0–0.2)
BASOPHILS NFR BLD AUTO: 0.5 % — SIGNIFICANT CHANGE UP (ref 0–2)
EOSINOPHIL # BLD AUTO: 0.42 K/UL — SIGNIFICANT CHANGE UP (ref 0–0.5)
EOSINOPHIL NFR BLD AUTO: 5.4 % — SIGNIFICANT CHANGE UP (ref 0–6)
HCT VFR BLD CALC: 40.7 % — SIGNIFICANT CHANGE UP (ref 34.5–45)
HGB BLD-MCNC: 13.6 G/DL — SIGNIFICANT CHANGE UP (ref 11.5–15.5)
IMM GRANULOCYTES NFR BLD AUTO: 0.6 % — SIGNIFICANT CHANGE UP (ref 0–0.9)
LYMPHOCYTES # BLD AUTO: 1.51 K/UL — SIGNIFICANT CHANGE UP (ref 1–3.3)
LYMPHOCYTES # BLD AUTO: 19.4 % — SIGNIFICANT CHANGE UP (ref 13–44)
MCHC RBC-ENTMCNC: 32.1 PG — SIGNIFICANT CHANGE UP (ref 27–34)
MCHC RBC-ENTMCNC: 33.4 GM/DL — SIGNIFICANT CHANGE UP (ref 32–36)
MCV RBC AUTO: 96 FL — SIGNIFICANT CHANGE UP (ref 80–100)
MONOCYTES # BLD AUTO: 0.66 K/UL — SIGNIFICANT CHANGE UP (ref 0–0.9)
MONOCYTES NFR BLD AUTO: 8.5 % — SIGNIFICANT CHANGE UP (ref 2–14)
NEUTROPHILS # BLD AUTO: 5.12 K/UL — SIGNIFICANT CHANGE UP (ref 1.8–7.4)
NEUTROPHILS NFR BLD AUTO: 65.6 % — SIGNIFICANT CHANGE UP (ref 43–77)
NRBC # BLD: 0 /100 WBCS — SIGNIFICANT CHANGE UP (ref 0–0)
PLATELET # BLD AUTO: 212 K/UL — SIGNIFICANT CHANGE UP (ref 150–400)
RBC # BLD: 4.24 M/UL — SIGNIFICANT CHANGE UP (ref 3.8–5.2)
RBC # FLD: 13.2 % — SIGNIFICANT CHANGE UP (ref 10.3–14.5)
WBC # BLD: 7.8 K/UL — SIGNIFICANT CHANGE UP (ref 3.8–10.5)
WBC # FLD AUTO: 7.8 K/UL — SIGNIFICANT CHANGE UP (ref 3.8–10.5)

## 2023-06-06 PROCEDURE — 99205 OFFICE O/P NEW HI 60 MIN: CPT

## 2023-06-07 PROBLEM — D05.12 DUCTAL CARCINOMA IN SITU (DCIS) OF LEFT BREAST: Status: ACTIVE | Noted: 2023-04-03

## 2023-06-07 LAB
24R-OH-CALCIDIOL SERPL-MCNC: 53 NG/ML — SIGNIFICANT CHANGE UP (ref 30–80)
ALBUMIN SERPL ELPH-MCNC: 4.5 G/DL — SIGNIFICANT CHANGE UP (ref 3.3–5)
ALP SERPL-CCNC: 85 U/L — SIGNIFICANT CHANGE UP (ref 40–120)
ALT FLD-CCNC: 25 U/L — SIGNIFICANT CHANGE UP (ref 10–45)
ANION GAP SERPL CALC-SCNC: 11 MMOL/L — SIGNIFICANT CHANGE UP (ref 5–17)
AST SERPL-CCNC: 19 U/L — SIGNIFICANT CHANGE UP (ref 10–40)
BILIRUB SERPL-MCNC: 0.4 MG/DL — SIGNIFICANT CHANGE UP (ref 0.2–1.2)
BUN SERPL-MCNC: 22 MG/DL — SIGNIFICANT CHANGE UP (ref 7–23)
CALCIUM SERPL-MCNC: 9.8 MG/DL — SIGNIFICANT CHANGE UP (ref 8.4–10.5)
CHLORIDE SERPL-SCNC: 104 MMOL/L — SIGNIFICANT CHANGE UP (ref 96–108)
CO2 SERPL-SCNC: 27 MMOL/L — SIGNIFICANT CHANGE UP (ref 22–31)
CREAT SERPL-MCNC: 0.75 MG/DL — SIGNIFICANT CHANGE UP (ref 0.5–1.3)
EGFR: 98 ML/MIN/1.73M2 — SIGNIFICANT CHANGE UP
ESTRADIOL FREE SERPL-MCNC: 7 PG/ML — SIGNIFICANT CHANGE UP
FERRITIN SERPL-MCNC: 79 NG/ML — SIGNIFICANT CHANGE UP (ref 15–150)
FOLATE SERPL-MCNC: 7.5 NG/ML — SIGNIFICANT CHANGE UP
FSH SERPL-MCNC: 105 IU/L — SIGNIFICANT CHANGE UP
GLUCOSE SERPL-MCNC: 105 MG/DL — HIGH (ref 70–99)
HCG SERPL-ACNC: 1 MIU/ML — SIGNIFICANT CHANGE UP
LH SERPL-ACNC: 39.9 IU/L — SIGNIFICANT CHANGE UP
POTASSIUM SERPL-MCNC: 4.8 MMOL/L — SIGNIFICANT CHANGE UP (ref 3.5–5.3)
POTASSIUM SERPL-SCNC: 4.8 MMOL/L — SIGNIFICANT CHANGE UP (ref 3.5–5.3)
PROT SERPL-MCNC: 7 G/DL — SIGNIFICANT CHANGE UP (ref 6–8.3)
SODIUM SERPL-SCNC: 143 MMOL/L — SIGNIFICANT CHANGE UP (ref 135–145)
VIT B12 SERPL-MCNC: 1096 PG/ML — SIGNIFICANT CHANGE UP (ref 232–1245)

## 2023-06-07 NOTE — PHYSICAL EXAM
[Fully active, able to carry on all pre-disease performance without restriction] : Status 0 - Fully active, able to carry on all pre-disease performance without restriction [Normal] : affect appropriate [de-identified] : very well appearing female, NAD, pleasant  [de-identified] : s/p L lumpectomy, well healed, scar tissue palpable, no palpable masses or LAD bilaterally

## 2023-06-07 NOTE — RESULTS/DATA
[FreeTextEntry1] : Ms. Sprague is a post-menopausal female who presented at age 47 in June 2023 for evaluation of newly diagnosed L breast cancer, ER+. \par The patient has a medical history of gestational diabetes, hypothyroidism (on synthroid). fracture of 5th metatarsal. \par \par Stage IA L breast IDC, ER+\par S/p L lumpectomy w/ Dr. Fontaine ([facility]) on 5/12/23 - IDC 0.4cm, as well as DCIS and LCIS. ER %, [place] negative, Her2 0, ki67 1%. S/p L SLNBx 5/26/23- negative 0/1 LN.\par \par We discussed management of early-stage hormone positive breast cancer at today's visit.\par We will present her case at our multidisciplinary tumor board to discuss any role for Oncotype testing.\par She will likely require radiation therapy followed by antiestrogen therapy with Arimidex.\par \par We discussed treatment with Arimidex 1mg PO daily for 5-10 years. Risks of hot flashes, vaginal dryness and bone loss were reviewed.  Close monitoring of bone density and calcium supplementation reviewed as well. Recommended 30 minutes of exercise daily to prevent joint pains. She is POST-menopausal. \par \par We will obtain results of her most recent bone density test.  Start calcium and vitamin D.\par Once she completes high-dose vitamin D can switch over to daily dosing 800 units daily.\par We will discuss next steps after her case has been presented at our tumor board.\par RTC 2 months

## 2023-06-07 NOTE — HISTORY OF PRESENT ILLNESS
[de-identified] : Referred by: Dr. Cara Navarro \par Diagnosis: L breast cancer, ER+\par \par Ms. Sprague is a post-menopausal female who presented at age 47 in 2023 for evaluation of newly diagnosed L breast cancer, ER+. \par The patient has a medical history of gestational diabetes, hypothyroidism (on synthroid). fracture of 5th metatarsal. \par Former smoker (quit 2023, 1/4 pack per day). Hx cholecystectomy . \par \par Her history dates back to an abnormal routine screening mammogram which revealed grouped calcifications in the posterior upper left breast. Her last MMG prior was in 2019. She denied any breast complaints at that time including breast mass, nipple discharge or breast pain.  She underwent L breast biopsy on 3/28/23 which revealed DCIS, solid type, low to intermediate grade. LCIS classic type, ADH and ALH present. %, TX 70% positive. She is now s/p L lumpectomy w/ Dr. Fontaine ([facility]) on 23 which revealed an invasive ductal carcinoma measuring 0.4cm, as well as DCIS and LCIS. ER %, TX negative, Her2 0, ki67 1%. S/p L SLNBx 23- negative 0/1 LN. Her genetic testing was negative. She reports that she is healing well after surgery. She denies headaches, back pain or unintentional weight loss. Her LMP was 2021 at age 45. She was seen by Dr. Navarro on 5/10/23 pre-operatively and is pending post-op appointment next week. \par \par Laboratory studies reviewed at today's visit and notable for: WBC 7.8, Hb 13.6, Plt 212 \par CMP, vitamin D, B12, folate all WNL\par Labs c/w post-menopausal status \par \par Imaging reviewed: \par Screening MMG 23 - Left breast- group of calcifications in the posterior upper left breast, 7.3 cm FN. Additional mammographic views of the left breast obtained the same day, demonstrate a mixture of calcifications with coarse heterogeneous and pleomorphic morphology, measuring 0.7 x 0.5 x 0.7 cm.heterogeneously dense breast, right breast without suspicious findings. \par \par Pathology reviewed: \par L breast lumpectomy 23 (Dr. Homer Fontaine)- IDC, mod diff, 0.4cm, no LVI, +DCIS solid/cribiform w/ intermediate grade, DCIS constitutes 95% of the tumor mass and extends beyond invasive tumor borders, LCIS present, ER+ %, TX negative (<1%), Her2 negative (0)\par Left anterior lumpectomy- benign breast tissue w/ ductal hyperplasia and fibroadenomatoid changes \par pT1a Nx (nodes not sent) \par L SLNBx 23- negative 0/1 LN.\par \par Genetics: [facility] negative on 23.\par \par HCM: \par - COVID vaccination: s/p 2 doses 2021 \par - Colonoscopy: not done, DUE \par - Gyn: done 2023- pap normal \par - Mammo: last in  prior to recently \par - Lung cancer screen: n/a\par - DEXA: 2023- normal \par \par SH: \par - Occupation: she is a  at [employer] \par - Living situation: lives in [place], with her  and 2 sons, ages 18 and 16 \par - Smoking/etoh/illicits: former smoker, quit in April, rare etoh, denies ilicits \par - Exercise: very physically active, weight training, spinning, 6 days per week \par \par OB/GYN Hx: \par - Age of menarche: 13\par - Age of menopause: 45\par - Pregnancy history:  (3 miscarriages in the past- latest at 12 weeks)\par - Age at live birth: 29\par - OCP use: 10 years \par - Fertility treatments: did 2 cycles of IUI \par - Hormonal therapy: n/a \par - Breast feeding history: 3 months each, total 6 months \par \par FH: \par - Maternal GF had tongue cancer in his 90s

## 2023-06-07 NOTE — CONSULT LETTER
[Dear  ___] : Dear  [unfilled], [Consult Letter:] : I had the pleasure of evaluating your patient, [unfilled]. [Please see my note below.] : Please see my note below. [Consult Closing:] : Thank you very much for allowing me to participate in the care of this patient.  If you have any questions, please do not hesitate to contact me. [Sincerely,] : Sincerely, [FreeTextEntry2] : Dr. Cara Navarro  [FreeTextEntry3] : Dr. Shaila Cheung\par  [DrTaylor  ___] : Dr. SHETTY

## 2023-06-09 ENCOUNTER — NON-APPOINTMENT (OUTPATIENT)
Age: 48
End: 2023-06-09

## 2023-06-12 ENCOUNTER — FORM ENCOUNTER (OUTPATIENT)
Age: 48
End: 2023-06-12

## 2023-06-13 ENCOUNTER — APPOINTMENT (OUTPATIENT)
Dept: RADIATION ONCOLOGY | Facility: CLINIC | Age: 48
End: 2023-06-13

## 2023-06-14 ENCOUNTER — NON-APPOINTMENT (OUTPATIENT)
Age: 48
End: 2023-06-14

## 2023-06-16 ENCOUNTER — APPOINTMENT (OUTPATIENT)
Dept: RADIATION ONCOLOGY | Facility: CLINIC | Age: 48
End: 2023-06-16
Payer: COMMERCIAL

## 2023-06-16 ENCOUNTER — RESULT REVIEW (OUTPATIENT)
Age: 48
End: 2023-06-16

## 2023-06-16 VITALS
OXYGEN SATURATION: 98 % | HEART RATE: 80 BPM | RESPIRATION RATE: 19 BRPM | SYSTOLIC BLOOD PRESSURE: 108 MMHG | DIASTOLIC BLOOD PRESSURE: 66 MMHG | BODY MASS INDEX: 23.92 KG/M2 | WEIGHT: 130 LBS | HEIGHT: 62 IN

## 2023-06-16 PROCEDURE — 77333 RADIATION TREATMENT AID(S): CPT

## 2023-06-16 PROCEDURE — 99213 OFFICE O/P EST LOW 20 MIN: CPT | Mod: 25

## 2023-06-16 PROCEDURE — 88321 CONSLTJ&REPRT SLD PREP ELSWR: CPT

## 2023-06-16 PROCEDURE — 77290 THER RAD SIMULAJ FIELD CPLX: CPT

## 2023-06-16 PROCEDURE — 77263 THER RADIOLOGY TX PLNG CPLX: CPT

## 2023-06-16 NOTE — PHYSICAL EXAM
[Thin] : thin [Symmetric] : breasts are symmetric [No UE Edema] : there is no upper extremity edema [Supraclavicular Lymph Nodes Enlarged Bilaterally] : supraclavicular [Axillary Lymph Nodes Enlarged Bilaterally] : axillary [Normal] : oriented to person, place and time, the affect was normal, the mood was normal and not anxious [de-identified] : Well-healed scar in the left axilla, left breast 3:00 and superior to areola. [de-identified] : Cording in left axilla

## 2023-06-16 NOTE — HISTORY OF PRESENT ILLNESS
[FreeTextEntry1] : The patient is a pleasant 47 year old female diagnosed with early stage left breast referred by Dr. Fontaine.\par \par She had an abnormal routine screening mammogram which revealed heterogeneously dense breast, right breast without suspicious findings. Left breast- group of calcifications in the posterior upper left breast, 7.3 cm FN. Additional mammographic views of the left breast obtained the same day, demonstrate a mixture of calcifications with coarse heterogeneous and pleomorphic morphology, measuring 0.7 x 0.5 x 0.7 cm.\par 3/28/23 stereotactic biopsy of calcifications in the posterior upper outer left breast. 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  LCIS classic type,  ADH and ALH present.  %, WA 70%positive.\par \par Left partial mastectomy was performed on 5/12/23 with Dr. Fontaine.  She underwent excision of the tumor at 3:00 as well as the papilloma at 12:00 which was negative.  \par Pathology left breast 3:00  demonstrated Invasive Ductal Carcinoma Grade 2, measuring 4 mm. Single focus. DCIS present and positive for EIC, size at least 5 mm. Solid and cribriform pattern, Grade II. LVI not identified. All margins negative for IDC and DCIS and > 2mm. ER %, WA Negative,HER2 negative, Ki-67 1%.\par [place] lymph node biopsy was performed on 5/26/2023 and 1 sentinel lymph node was negative.\par \par [facility] negative on 4/12/23\par .She works full time as a  in [employer]. She has 2 sons ages 18 and 16. 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  She plans to start tamoxifen with Dr. Hopper following completion of radiotherapy since her bone density showed osteopenia.\par She presents for radiation simulation..

## 2023-06-20 DIAGNOSIS — C50.912 MALIGNANT NEOPLASM OF UNSPECIFIED SITE OF LEFT FEMALE BREAST: ICD-10-CM

## 2023-06-20 DIAGNOSIS — D05.12 INTRADUCTAL CARCINOMA IN SITU OF LEFT BREAST: ICD-10-CM

## 2023-06-20 LAB — SURGICAL PATHOLOGY STUDY: SIGNIFICANT CHANGE UP

## 2023-06-23 ENCOUNTER — RESULT REVIEW (OUTPATIENT)
Age: 48
End: 2023-06-23

## 2023-06-26 ENCOUNTER — APPOINTMENT (OUTPATIENT)
Dept: ORTHOPEDIC SURGERY | Facility: CLINIC | Age: 48
End: 2023-06-26
Payer: OTHER MISCELLANEOUS

## 2023-06-26 ENCOUNTER — RX RENEWAL (OUTPATIENT)
Age: 48
End: 2023-06-26

## 2023-06-26 ENCOUNTER — APPOINTMENT (OUTPATIENT)
Dept: PHYSICAL MEDICINE AND REHAB | Facility: CLINIC | Age: 48
End: 2023-06-26
Payer: COMMERCIAL

## 2023-06-26 VITALS
HEIGHT: 62 IN | DIASTOLIC BLOOD PRESSURE: 79 MMHG | WEIGHT: 130 LBS | SYSTOLIC BLOOD PRESSURE: 127 MMHG | RESPIRATION RATE: 14 BRPM | BODY MASS INDEX: 23.92 KG/M2 | HEART RATE: 70 BPM

## 2023-06-26 DIAGNOSIS — M79.672 PAIN IN LEFT FOOT: ICD-10-CM

## 2023-06-26 DIAGNOSIS — S92.352G DISPLACED FRACTURE OF FIFTH METATARSAL BONE, LEFT FOOT, SUBSEQUENT ENCOUNTER FOR FRACTURE WITH DELAYED HEALING: ICD-10-CM

## 2023-06-26 DIAGNOSIS — G89.28 OTHER CHRONIC POSTPROCEDURAL PAIN: ICD-10-CM

## 2023-06-26 DIAGNOSIS — Z78.9 OTHER SPECIFIED HEALTH STATUS: ICD-10-CM

## 2023-06-26 DIAGNOSIS — Z91.89 OTHER SPECIFIED PERSONAL RISK FACTORS, NOT ELSEWHERE CLASSIFIED: ICD-10-CM

## 2023-06-26 DIAGNOSIS — R60.9 EDEMA, UNSPECIFIED: ICD-10-CM

## 2023-06-26 DIAGNOSIS — M20.22 HALLUX RIGIDUS, LEFT FOOT: ICD-10-CM

## 2023-06-26 DIAGNOSIS — Z85.3 PERSONAL HISTORY OF MALIGNANT NEOPLASM OF BREAST: ICD-10-CM
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
## 2023-06-26 DIAGNOSIS — M84.375A STRESS FRACTURE, LEFT FOOT, INITIAL ENCOUNTER FOR FRACTURE: ICD-10-CM

## 2023-06-26 DIAGNOSIS — S99.922A UNSPECIFIED INJURY OF LEFT FOOT, INITIAL ENCOUNTER: ICD-10-CM

## 2023-06-26 PROCEDURE — 99213 OFFICE O/P EST LOW 20 MIN: CPT

## 2023-06-26 PROCEDURE — 99204 OFFICE O/P NEW MOD 45 MIN: CPT | Mod: 25

## 2023-06-26 PROCEDURE — 93702 BIS XTRACELL FLUID ANALYSIS: CPT

## 2023-06-26 RX ORDER — ERGOCALCIFEROL 1.25 MG/1
1.25 MG CAPSULE, LIQUID FILLED ORAL
Qty: 4 | Refills: 0 | Status: ACTIVE | COMMUNITY
Start: 2023-04-03 | End: 1900-01-01

## 2023-06-26 NOTE — DISCUSSION/SUMMARY
[de-identified] : Today I had a lengthy discussion with the patient regarding their left Metatarsal fracture/pain. I have addressed all the patient's concerns surrounding the pathology of their condition. At this time I advised the patient to continue with conservative treatment with their ailment. Due to the patient's condition surgical intervention is not applicable.\par \par I recommend the patient follow up PRN to reassess their condition.\par \par The patient understood and verbally agreed to the treatment plan. All of their questions were answered and they were satisfied with the visit. The patient should call the office if they have any questions or experience worsening symptoms. \par \par She is currently full time working without limitations.

## 2023-06-26 NOTE — PHYSICAL EXAM
[FreeTextEntry1] : Gen: Patient is A&O x 3, NAD\par HEENT: EOMI, hearing grossly normal\par Resp: regular, non - labored\par CV: pulses regular\par Skin: no rashes, erythema\par Lymph: no clubbing, cyanosis, edema\par Inspection: no instability \par ROM: Left shoulder abduction ~170 degrees \par Palpation:+cording to palpation in left axilla \par Sensation: intact to light touch\par Reflexes: 1+ and symmetric throughout\par Strength: 5/5 throughout\par Special tests: -Reynolds sign\par Gait: normal, non-antalgic\par \par Bioimpedance spectroscopy performed today with L-Dex 4.8 LUE (post-operative baseline). \par \par

## 2023-06-26 NOTE — ADDENDUM
[FreeTextEntry1] : I, MILLI HAYS, acted solely as a scribe for Dr. Jcarlos Watson on this date 06/26/2023  .\par  \par All medical record entries made by the Scribe were at my, Dr. Jcarlos Watson, direction and personally dictated by me on 06/26/2023 . I have reviewed the chart and agree that the record accurately reflects my personal performance of the history, physical exam, assessment and plan. I have also personally directed, reviewed, and agreed with the chart.

## 2023-06-26 NOTE — PHYSICAL EXAM
[de-identified] : Left Foot Exam\par \par Skin: Clean, dry, intact\par Inspection: No obvious malalignment, no masses, no swelling, no effusion\par Pulses: 2+ DP/PT pulses\par ROM: LEFT FOOT ROM of 1st digit : 50 degrees of dorsiflexion, 40 degrees of plantarflexion\par Painful ROM: None\par Tenderness: + lateral pain/burning, + Base of the fifth metatarsal tenderness, + Tenderness to 1st MTP joint, No tenderness over the medial malleolus, No tenderness over the lateral malleolus, no CFL/ATFL/PTFL pain, no deltoid ligament pain. No heel pain. No Achilles tenderness. No pain to the LisFranc joint. No ttp over the posterior tibial tendon.\par Stability: Negative anterior/posterior drawer.\par Strength: 5/5 ADD/ABD/TA/GS/EHL/FHL/EDL\par Neuro: Sensation in tact to light touch throughout\par Additional tests: Negative Mortons test, negative tarsal tunnel tinels, negative single heel rise.  [de-identified] : No new imaging performed.

## 2023-06-26 NOTE — HISTORY OF PRESENT ILLNESS
[FreeTextEntry1] : Ms. Sprague is a 48-year-old female diagnosed with L breast IDC, ER+, WV-, Her2-Stage IA (T1a,N0).  S/p L lumpectomy 5/12/23 w/ Dr. Fontaine which revealed IDC, mod diff, 0.4cm, no LVI, +DCIS solid/cribiform w/ intermediate grade, DCIS and LCIS present, invasive component is ER+ %, WV negative (<1%), Her2 negative (0).  L SLNBx 5/26/23- negative 0/1 LN.  Planning to start RT.  Planning to start Tamoxifen. \par \par \par She reports that since her surgery she has had a pulling sensation in her left upper extremity.  Feels tightness with overhead activities.  Denies any swelling or heaviness in her left upper extremity.  Denies any redness or erythema.\par     \par \par

## 2023-06-26 NOTE — ASSESSMENT
[FreeTextEntry1] : 48 year old female presenting for evaluation.\par \par #Postmastectomy pain syndrome: \par -Secondary to axillary web syndrome\par -Obtain US LUE to rule out DVT\par -Start breast rehabilitation program for manual therapy and pectoral/shoulder ROM/stretching\par -Case discussed with Gloria John-PT, expedite case given upcoming RT\par \par \par #At risk for lymphedema:\par -No clinical signs of lymphedema on exam\par -Lymphedema education provided to patient\par -Bioimpedance spectroscopy performed today with L-dex appropriate\par -Next surveillance in September 2023.\par \par Follow up in 6 weeks.  \par \par The patient had a baseline SOZO measurement, which I reviewed today. Bioimpedance spectroscopy helps identify the onset of lymphedema in an arm or leg before patients experience noticeable swelling. Research has shown that the early detection of lymphedema using L-Dex combined with treatment can reduce progression to chronic lymphedema by 95% in breast cancer patients. Whenever possible, patients are tested for baseline L-Dex score before cancer treatment begins and then are reassessed during regular follow-up visits using the SOZO device. Otherwise, this can be started postoperatively and continued during regular follow-up visits. If the patient’s L-Dex score increases above normal levels, that is a sign that lymphedema is developing, and a referral is made to physical therapy for further evaluation and/or early compression treatment. Lymphedema assessment with the SOZO L-Dex score is recommended to be done every 3 months for the first 3 years and then every 6 months for years 4 and 5, followed by annually afterwards.\par

## 2023-06-26 NOTE — HISTORY OF PRESENT ILLNESS
[FreeTextEntry1] : 6/26/23: ABHIJEET PAULA is a 48 year old female who presents for follow up evaluation of work related injury. She states that her pain has continued since the last visit. She has been compliant with vitamin D supplementation. She presents today wearing sneakers and is ambulating without assistance. She is currently working with no limitations.  \par \par 05/15/23: Patient is a 47 year old, right hand dominant female who presents in office for follow up evaluation of work related injury. She reports that her pain has remained the same since the last visit. She describes the pain as a constant burning sensation to the lateral plantar aspect of her left foot. She states that she was not able to utilize the orthotics and the bone stimulator. She presents today wearing sneakers and is ambulating without assistance. She reports that she had a bone density scan in march which did not show any abnormality. \par \par 4/03/23:Patient is a 47 year old, right hand dominant female who presents in office status post work related injury on 06/06/2022. The patient works as a Teacher in [employer]. During the injury the patient injured her left foot. The patient states that she was at field day and was dancing with her class on back-top, stepped down and felt a pop thorough out the lateral side of her left foot. She initially went to urgent care and followed up with [facility]. The patient states that she went throughout conservative treatment (physical therapy, CAM boot) with feeling minimal relief. She is here seeking a second opinion. She endorses lateral foot, big toe, 2nd and 3rd toe pain. She does experiences some numbness into all of her toes. She presents today jalil HOKA sneakers and is ambulating without assistance. No other complaints.\par \par Pain scale: 6/10\par \par Working: The patient is currently working\par Driving: The patient is currently driving\par \par Activities that make the pain better:\par Rest\par Ice\par Heat\par \par Activities that make pain worse:\par ADL's\par Lifting heavy objects\par Chores\par Work\par Sleeping\par Stairs\par Seating to standing position\par Walking\par

## 2023-06-28 PROCEDURE — 77334 RADIATION TREATMENT AID(S): CPT

## 2023-06-28 PROCEDURE — 77295 3-D RADIOTHERAPY PLAN: CPT

## 2023-06-28 PROCEDURE — 77300 RADIATION THERAPY DOSE PLAN: CPT

## 2023-07-10 PROCEDURE — 77280 THER RAD SIMULAJ FIELD SMPL: CPT

## 2023-07-11 ENCOUNTER — NON-APPOINTMENT (OUTPATIENT)
Age: 48
End: 2023-07-11

## 2023-07-11 PROCEDURE — G6017: CPT

## 2023-07-11 PROCEDURE — G6012: CPT

## 2023-07-11 NOTE — HISTORY OF PRESENT ILLNESS
[FreeTextEntry1] : The patient is a pleasant 47 year old female diagnosed with early stage left breast referred by Dr. Fontaine.\par \par She had an abnormal routine screening mammogram which revealed heterogeneously dense breast, right breast without suspicious findings. Left breast- group of calcifications in the posterior upper left breast, 7.3 cm FN. Additional mammographic views of the left breast obtained the same day, demonstrate a mixture of calcifications with coarse heterogeneous and pleomorphic morphology, measuring 0.7 x 0.5 x 0.7 cm.\par 3/28/23 stereotactic biopsy of calcifications in the posterior upper outer left breast. 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  LCIS classic type,  ADH and ALH present.  %, CO 70%positive.\par \par Left partial mastectomy was performed on 5/12/23 with Dr. Fontaine.  She underwent excision of the tumor at 3:00 as well as the papilloma at 12:00 which was negative.  \par Pathology left breast 3:00  demonstrated Invasive Ductal Carcinoma Grade 2, measuring 4 mm. Single focus. DCIS present and positive for EIC, size at least 5 mm. Solid and cribriform pattern, Grade II. LVI not identified. All margins negative for IDC and DCIS and > 2mm. ER %, CO Negative,HER2 negative, Ki-67 1%.\par [place] lymph node biopsy was performed on 5/26/2023 and 1 sentinel lymph node was negative.\par \par [employer] negative on 4/12/23\par .She works full time as a  in [place] [employer]. She has 2 sons ages 18 and 16. 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  She plans to start tamoxifen with Dr. Hopper following completion of radiotherapy since her bone density showed osteopenia.\par She presents for radiation simulation..\par \par Pt seen for OTV 1/20. She feels well. Using Rejuvaskin. Reports some anxiety resulting in poor sleep. Certified for medical cannabis today.

## 2023-07-11 NOTE — PHYSICAL EXAM
[Normal] : well developed, well nourished, in no acute distress [de-identified] : G0 erythema L breast

## 2023-07-11 NOTE — DISEASE MANAGEMENT
[Pathological] : TNM Stage: p [IA] : IA [TTNM] : 1a [NTNM] : 0 [MTNM] : 0 [de-identified] : 771 [de-identified] : 5697 [de-identified] : Left Breast

## 2023-07-12 PROCEDURE — G6012: CPT

## 2023-07-12 PROCEDURE — G6017: CPT

## 2023-07-13 PROCEDURE — G6012: CPT

## 2023-07-13 PROCEDURE — G6017: CPT

## 2023-07-14 PROCEDURE — G6017: CPT

## 2023-07-14 PROCEDURE — G6012: CPT

## 2023-07-17 PROCEDURE — 77427 RADIATION TX MANAGEMENT X5: CPT

## 2023-07-17 PROCEDURE — 77417 THER RADIOLOGY PORT IMAGE(S): CPT

## 2023-07-17 PROCEDURE — G6012: CPT

## 2023-07-18 ENCOUNTER — NON-APPOINTMENT (OUTPATIENT)
Age: 48
End: 2023-07-18

## 2023-07-18 VITALS
HEIGHT: 62 IN | RESPIRATION RATE: 21 BRPM | BODY MASS INDEX: 23.92 KG/M2 | HEART RATE: 87 BPM | WEIGHT: 130 LBS | OXYGEN SATURATION: 100 %

## 2023-07-18 PROCEDURE — G6017: CPT

## 2023-07-18 PROCEDURE — G6012: CPT

## 2023-07-18 NOTE — HISTORY OF PRESENT ILLNESS
[FreeTextEntry1] : The patient is a pleasant 47 year old female diagnosed with early stage left breast referred by Dr. Fontaine.\par \par She had an abnormal routine screening mammogram which revealed heterogeneously dense breast, right breast without suspicious findings. Left breast- group of calcifications in the posterior upper left breast, 7.3 cm FN. Additional mammographic views of the left breast obtained the same day, demonstrate a mixture of calcifications with coarse heterogeneous and pleomorphic morphology, measuring 0.7 x 0.5 x 0.7 cm.\par 3/28/23 stereotactic biopsy of calcifications in the posterior upper outer left breast. 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  LCIS classic type,  ADH and ALH present.  %, ID 70%positive.\par \par Left partial mastectomy was performed on 5/12/23 with Dr. Fontaine.  She underwent excision of the tumor at 3:00 as well as the papilloma at 12:00 which was negative.  \par Pathology left breast 3:00  demonstrated Invasive Ductal Carcinoma Grade 2, measuring 4 mm. Single focus. DCIS present and positive for EIC, size at least 5 mm. Solid and cribriform pattern, Grade II. LVI not identified. All margins negative for IDC and DCIS and > 2mm. ER %, ID Negative,HER2 negative, Ki-67 1%.\par [place] lymph node biopsy was performed on 5/26/2023 and 1 sentinel lymph node was negative.\par \par [email or web address] negative on 4/12/23\par .She works full time as a  in [place]. She has 2 sons ages 18 and 16. 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  She plans to start tamoxifen with Dr. Hopper following completion of radiotherapy since her bone density showed osteopenia.\par She presents for radiation simulation..\par \par Pt seen for OTV 6/20. She feels well. Using Rejuvaskin. Reports some anxiety resulting in poor sleep. Certified for medical cannabis. Reports pain in L axillary scar with early keloid.  She also reports left nipple sensitivity.  Enjoying reflexology weekly.

## 2023-07-18 NOTE — DISEASE MANAGEMENT
[Pathological] : TNM Stage: p [IA] : IA [TTNM] : 1a [NTNM] : 0 [MTNM] : 0 [de-identified] : 7974 [de-identified] : 4401 [de-identified] : Left Breast

## 2023-07-18 NOTE — PHYSICAL EXAM
[Normal] : well developed, well nourished, in no acute distress [de-identified] : Faint grade 1 erythema left breast with mild thickening of the left axillary scar which is outside the radiation field.

## 2023-07-19 PROCEDURE — G6012: CPT

## 2023-07-19 PROCEDURE — G6017: CPT

## 2023-07-20 PROCEDURE — G6012: CPT

## 2023-07-20 PROCEDURE — G6017: CPT

## 2023-07-24 PROCEDURE — G6017: CPT

## 2023-07-24 PROCEDURE — G6012: CPT

## 2023-07-25 ENCOUNTER — NON-APPOINTMENT (OUTPATIENT)
Age: 48
End: 2023-07-25

## 2023-07-25 VITALS
HEIGHT: 62 IN | OXYGEN SATURATION: 100 % | BODY MASS INDEX: 23.92 KG/M2 | RESPIRATION RATE: 18 BRPM | WEIGHT: 130 LBS | HEART RATE: 62 BPM

## 2023-07-25 PROCEDURE — G6012: CPT

## 2023-07-25 PROCEDURE — 77336 RADIATION PHYSICS CONSULT: CPT

## 2023-07-25 PROCEDURE — 77427 RADIATION TX MANAGEMENT X5: CPT

## 2023-07-25 PROCEDURE — 77417 THER RADIOLOGY PORT IMAGE(S): CPT

## 2023-07-25 NOTE — PHYSICAL EXAM
[Normal] : well developed, well nourished, in no acute distress [de-identified] : Faint L breast erythema

## 2023-07-25 NOTE — HISTORY OF PRESENT ILLNESS
[FreeTextEntry1] : The patient is a pleasant 48 year old female diagnosed with early stage left breast referred by Dr. Fontaine.\par \par She had an abnormal routine screening mammogram which revealed heterogeneously dense breast, right breast without suspicious findings. Left breast- group of calcifications in the posterior upper left breast, 7.3 cm FN. Additional mammographic views of the left breast obtained the same day, demonstrate a mixture of calcifications with coarse heterogeneous and pleomorphic morphology, measuring 0.7 x 0.5 x 0.7 cm.\par 3/28/23 stereotactic biopsy of calcifications in the posterior upper outer left breast. 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  LCIS classic type,  ADH and ALH present.  %, SC 70%positive.\par \par Left partial mastectomy was performed on 5/12/23 with Dr. Fontaine.  She underwent excision of the tumor at 3:00 as well as the papilloma at 12:00 which was negative.  \par Pathology left breast 3:00  demonstrated Invasive Ductal Carcinoma Grade 2, measuring 4 mm. Single focus. DCIS present and positive for EIC, size at least 5 mm. Solid and cribriform pattern, Grade II. LVI not identified. All margins negative for IDC and DCIS and > 2mm. ER %, SC Negative,HER2 negative, Ki-67 1%.\par [place] lymph node biopsy was performed on 5/26/2023 and 1 sentinel lymph node was negative.\par \par [employer] negative on 4/12/23\par .She works full time as a  in [place]. She has 2 sons ages 18 and 16. 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  She plans to start tamoxifen with Dr. Hopper following completion of radiotherapy since her bone density showed osteopenia.\par She presents for radiation simulation..\par \par Pt seen for OTV 11/20. She feels well. Using Rejuvaskin. Reports some anxiety resulting in poor sleep. Certified for medical cannabis. Reports pain in L axillary scar with early keloid.  She also reports left nipple sensitivity.  Enjoying reflexology weekly. G1 faint erythema.

## 2023-07-25 NOTE — DISEASE MANAGEMENT
[Pathological] : TNM Stage: p [IA] : IA [TTNM] : 1a [NTNM] : 0 [MTNM] : 0 [de-identified] : 8345 [de-identified] : 4324 [de-identified] : Left Breast

## 2023-07-26 PROCEDURE — G6017: CPT

## 2023-07-26 PROCEDURE — G6012: CPT

## 2023-07-27 PROCEDURE — G6017: CPT

## 2023-07-27 PROCEDURE — G6012: CPT

## 2023-07-28 PROCEDURE — G6012: CPT

## 2023-07-28 PROCEDURE — G6017: CPT

## 2023-07-31 PROCEDURE — G6012: CPT

## 2023-08-01 ENCOUNTER — NON-APPOINTMENT (OUTPATIENT)
Age: 48
End: 2023-08-01

## 2023-08-01 PROCEDURE — G6012: CPT

## 2023-08-01 PROCEDURE — 77417 THER RADIOLOGY PORT IMAGE(S): CPT

## 2023-08-01 PROCEDURE — 77427 RADIATION TX MANAGEMENT X5: CPT

## 2023-08-01 PROCEDURE — 77336 RADIATION PHYSICS CONSULT: CPT

## 2023-08-01 NOTE — DISEASE MANAGEMENT
[Pathological] : TNM Stage: p [IA] : IA [TTNM] : 1a [NTNM] : 0 [MTNM] : 0 [de-identified] : 9181 [de-identified] : 8433 [de-identified] : Left Breast

## 2023-08-01 NOTE — HISTORY OF PRESENT ILLNESS
[FreeTextEntry1] : The patient is a pleasant 48 year old female diagnosed with early stage left breast referred by Dr. Fontaine.  She had an abnormal routine screening mammogram which revealed heterogeneously dense breast, right breast without suspicious findings. Left breast- group of calcifications in the posterior upper left breast, 7.3 cm FN. Additional mammographic views of the left breast obtained the same day, demonstrate a mixture of calcifications with coarse heterogeneous and pleomorphic morphology, measuring 0.7 x 0.5 x 0.7 cm. 3/28/23 stereotactic biopsy of calcifications in the posterior upper outer left breast. 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  LCIS classic type,  ADH and ALH present.  %, IA 70%positive.  Left partial mastectomy was performed on 5/12/23 with Dr. Fontaine.  She underwent excision of the tumor at 3:00 as well as the papilloma at 12:00 which was negative.   Pathology left breast 3:00  demonstrated Invasive Ductal Carcinoma Grade 2, measuring 4 mm. Single focus. DCIS present and positive for EIC, size at least 5 mm. Solid and cribriform pattern, Grade II. LVI not identified. All margins negative for IDC and DCIS and > 2mm. ER %, IA Negative,HER2 negative, Ki-67 1%. [place] lymph node biopsy was performed on 5/26/2023 and 1 sentinel lymph node was negative.  [employer] negative on 4/12/23 .She works full time as a  in [employer]. She has 2 sons ages 18 and 16. 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  She plans to start tamoxifen with Dr. Hopper following completion of radiotherapy since her bone density showed osteopenia. She presents for radiation simulation..  Pt seen for OTV 15/20. She feels well. Using Rejuvaskin. Reports some anxiety resulting in poor sleep. Certified for medical cannabis. Reports pain in L axillary scar with early keloid.  She also reports left nipple sensitivity.  Enjoying reflexology weekly. G1 L breast erythema with mild folliculitis UIQ.

## 2023-08-01 NOTE — PHYSICAL EXAM
[Normal] : well developed, well nourished, in no acute distress [de-identified] : Grade 1 erythema left breast with mild folliculitis upper inner quadrant

## 2023-08-02 PROCEDURE — 77280 THER RAD SIMULAJ FIELD SMPL: CPT

## 2023-08-02 PROCEDURE — G6012: CPT

## 2023-08-02 PROCEDURE — G6017: CPT

## 2023-08-03 PROCEDURE — G6012: CPT

## 2023-08-03 PROCEDURE — G6017: CPT

## 2023-08-04 PROCEDURE — G6012: CPT

## 2023-08-04 PROCEDURE — G6017: CPT

## 2023-08-04 NOTE — DISEASE MANAGEMENT
[Pathological] : TNM Stage: p [TTNM] : 1a [NTNM] : 0 [MTNM] : 0 [IA] : IA [de-identified] : 4244 [de-identified] : 6776 [de-identified] : Left Breast

## 2023-08-04 NOTE — HISTORY OF PRESENT ILLNESS
[FreeTextEntry1] : The patient is a pleasant 48 year old female diagnosed with early stage left breast referred by Dr. Fontaine.  She had an abnormal routine screening mammogram which revealed heterogeneously dense breast, right breast without suspicious findings. Left breast- group of calcifications in the posterior upper left breast, 7.3 cm FN. Additional mammographic views of the left breast obtained the same day, demonstrate a mixture of calcifications with coarse heterogeneous and pleomorphic morphology, measuring 0.7 x 0.5 x 0.7 cm. 3/28/23 stereotactic biopsy of calcifications in the posterior upper outer left breast. 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  LCIS classic type,  ADH and ALH present.  %, RI 70%positive.  Left partial mastectomy was performed on 5/12/23 with Dr. Fontaine.  She underwent excision of the tumor at 3:00 as well as the papilloma at 12:00 which was negative.   Pathology left breast 3:00  demonstrated Invasive Ductal Carcinoma Grade 2, measuring 4 mm. Single focus. DCIS present and positive for EIC, size at least 5 mm. Solid and cribriform pattern, Grade II. LVI not identified. All margins negative for IDC and DCIS and > 2mm. ER %, RI Negative,HER2 negative, Ki-67 1%. [place] lymph node biopsy was performed on 5/26/2023 and 1 sentinel lymph node was negative.  [employer] negative on 4/12/23 .She works full time as a  in [place]. She has 2 sons ages 18 and 16. 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  She plans to start tamoxifen with Dr. Hopper following completion of radiotherapy since her bone density showed osteopenia. She presents for radiation simulation..  Pt seen for OTV 20/20. She feels well. Using Rejuvaskin. Reports some anxiety resulting in poor sleep. Certified for medical cannabis. Reports pain in L axillary scar with early keloid.  She also reports left nipple sensitivity.  Enjoying reflexology weekly. G1 L breast erythema with mild folliculitis UIQ.

## 2023-08-07 PROCEDURE — 77417 THER RADIOLOGY PORT IMAGE(S): CPT

## 2023-08-07 PROCEDURE — G6012: CPT

## 2023-08-08 ENCOUNTER — NON-APPOINTMENT (OUTPATIENT)
Age: 48
End: 2023-08-08

## 2023-08-08 PROCEDURE — G6012: CPT

## 2023-08-08 PROCEDURE — 77336 RADIATION PHYSICS CONSULT: CPT

## 2023-08-08 PROCEDURE — G6017: CPT

## 2023-08-08 PROCEDURE — 77427 RADIATION TX MANAGEMENT X5: CPT

## 2023-08-08 NOTE — HISTORY OF PRESENT ILLNESS
[FreeTextEntry1] : The patient is a pleasant 48 year old female diagnosed with early stage left breast referred by Dr. Fontaine.  She had an abnormal routine screening mammogram which revealed heterogeneously dense breast, right breast without suspicious findings. Left breast- group of calcifications in the posterior upper left breast, 7.3 cm FN. Additional mammographic views of the left breast obtained the same day, demonstrate a mixture of calcifications with coarse heterogeneous and pleomorphic morphology, measuring 0.7 x 0.5 x 0.7 cm. 3/28/23 stereotactic biopsy of calcifications in the posterior upper outer left breast. 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  LCIS classic type,  ADH and ALH present.  %, ME 70%positive.  Left partial mastectomy was performed on 5/12/23 with Dr. Fontaine.  She underwent excision of the tumor at 3:00 as well as the papilloma at 12:00 which was negative.   Pathology left breast 3:00  demonstrated Invasive Ductal Carcinoma Grade 2, measuring 4 mm. Single focus. DCIS present and positive for EIC, size at least 5 mm. Solid and cribriform pattern, Grade II. LVI not identified. All margins negative for IDC and DCIS and > 2mm. ER %, ME Negative,HER2 negative, Ki-67 1%. [place] lymph node biopsy was performed on 5/26/2023 and 1 sentinel lymph node was negative.  [employer] negative on 4/12/23 .She works full time as a  in [place]. She has 2 sons ages 18 and 16. 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  She plans to start tamoxifen with Dr. Hopper following completion of radiotherapy since her bone density showed osteopenia. She presents for radiation simulation..  Pt seen for OTV 20/20. She feels well. Using Rejuvaskin. Reports some anxiety resulting in poor sleep. Certified for medical cannabis. Reports pain in L axillary scar with early keloid.  She also reports left nipple sensitivity.  Enjoying reflexology weekly.

## 2023-08-08 NOTE — DISEASE MANAGEMENT
[Pathological] : TNM Stage: p [IA] : IA [TTNM] : 1a [NTNM] : 0 [MTNM] : 0 [de-identified] : 4903 [de-identified] : 4508 [de-identified] : Left Breast

## 2023-08-29 ENCOUNTER — APPOINTMENT (OUTPATIENT)
Dept: HEMATOLOGY ONCOLOGY | Facility: CLINIC | Age: 48
End: 2023-08-29

## 2023-09-07 ENCOUNTER — APPOINTMENT (OUTPATIENT)
Dept: RADIATION ONCOLOGY | Facility: CLINIC | Age: 48
End: 2023-09-07
Payer: COMMERCIAL

## 2023-09-07 VITALS
SYSTOLIC BLOOD PRESSURE: 106 MMHG | WEIGHT: 130 LBS | HEIGHT: 62 IN | HEART RATE: 60 BPM | BODY MASS INDEX: 23.92 KG/M2 | DIASTOLIC BLOOD PRESSURE: 76 MMHG | OXYGEN SATURATION: 100 % | RESPIRATION RATE: 18 BRPM

## 2023-09-07 PROCEDURE — 99024 POSTOP FOLLOW-UP VISIT: CPT

## 2023-09-07 NOTE — PHYSICAL EXAM
[Breast Appearance] : normal in appearance [Symmetric] : breasts are symmetric [No UE Edema] : there is no upper extremity edema [Normal] : oriented to person, place and time, the affect was normal, the mood was normal and not anxious

## 2023-09-07 NOTE — HISTORY OF PRESENT ILLNESS
[FreeTextEntry1] : The patient is a pleasant 48 year old female diagnosed with early stage left breast referred by Dr. Fontaine.  She had an abnormal routine screening mammogram which revealed heterogeneously dense breast, right breast without suspicious findings. Left breast- group of calcifications in the posterior upper left breast, 7.3 cm FN. Additional mammographic views of the left breast obtained the same day, demonstrate a mixture of calcifications with coarse heterogeneous and pleomorphic morphology, measuring 0.7 x 0.5 x 0.7 cm. 3/28/23 stereotactic biopsy of calcifications in the posterior upper outer left breast. 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  LCIS classic type,  ADH and ALH present.  %, OR 70%positive.  Left partial mastectomy was performed on 5/12/23 with Dr. Fontaine.  She underwent excision of the tumor at 3:00 as well as the papilloma at 12:00 which was negative.   Pathology left breast 3:00  demonstrated Invasive Ductal Carcinoma Grade 2, measuring 4 mm. Single focus. DCIS present and positive for EIC, size at least 5 mm. Solid and cribriform pattern, Grade II. LVI not identified. All margins negative for IDC and DCIS and > 2mm. ER %, OR Negative,HER2 negative, Ki-67 1%. [place] lymph node biopsy was performed on 5/26/2023 and 1 sentinel lymph node was negative.  [employer] negative on 4/12/23 .She works full time as a  in [place]. She has 2 sons ages 18 and 16. 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  She plans to start tamoxifen with Dr. Hopper following completion of radiotherapy since her bone density showed osteopenia. She completed RT to the left breast with 5240 cGy and 20 fractions on 8/8/23.  She presents for 1 month PTE. She feels well. Using Rejuvaskin. Reports some anxiety resulting in poor sleep for which she was certified for medical cannabis. She enjoyed reflexology in our office while on treatment. Reports mild occasional left breast discomfort- positional. She discussed AI treatment with Dr. Cheung and Tara and decided against Anastrozole due to Hx of osteopenia or Tamoxifen due to side effects it might produce.

## 2023-10-18 ENCOUNTER — NON-APPOINTMENT (OUTPATIENT)
Age: 48
End: 2023-10-18

## 2023-10-18 DIAGNOSIS — Z86.000 PERSONAL HISTORY OF IN-SITU NEOPLASM OF BREAST: ICD-10-CM

## 2023-10-25 ENCOUNTER — APPOINTMENT (OUTPATIENT)
Dept: BREAST CENTER | Facility: CLINIC | Age: 48
End: 2023-10-25
Payer: COMMERCIAL

## 2023-10-25 VITALS — HEIGHT: 62 IN | RESPIRATION RATE: 16 BRPM | WEIGHT: 130 LBS | BODY MASS INDEX: 23.92 KG/M2

## 2023-10-25 PROCEDURE — 76642 ULTRASOUND BREAST LIMITED: CPT | Mod: LT

## 2023-10-25 PROCEDURE — 99213 OFFICE O/P EST LOW 20 MIN: CPT | Mod: 25

## 2023-10-25 RX ORDER — ANASTROZOLE TABLETS 1 MG/1
1 TABLET ORAL DAILY
Qty: 30 | Refills: 5 | Status: DISCONTINUED | COMMUNITY
Start: 2023-06-06 | End: 2023-10-01

## 2024-03-27 ENCOUNTER — APPOINTMENT (OUTPATIENT)
Dept: BREAST CENTER | Facility: CLINIC | Age: 49
End: 2024-03-27
Payer: COMMERCIAL

## 2024-03-27 DIAGNOSIS — N64.89 OTHER SPECIFIED DISORDERS OF BREAST: ICD-10-CM

## 2024-03-27 PROCEDURE — 99213 OFFICE O/P EST LOW 20 MIN: CPT | Mod: 25

## 2024-03-27 PROCEDURE — 76642 ULTRASOUND BREAST LIMITED: CPT | Mod: 50

## 2024-04-02 PROBLEM — N64.89 BREAST ASYMMETRY: Status: ACTIVE | Noted: 2023-10-29

## 2024-04-02 NOTE — PROCEDURE
[FreeTextEntry3] : Bilateral breast ultrasound  The patient has a history for left breast cancer  Four-quadrant survey of the left breast demonstrates no developing mass  There is no suspicious lymphadenopathy  Four-quadrant survey the right breast demonstrates no developing mass  There is no suspicious lymphadenopathy  BI-RADS 2

## 2024-04-02 NOTE — ASSESSMENT
[FreeTextEntry1] : History of left breast cancer  No evidence of recurrence clinically or on ultrasound exam performed in the office today  Patient assisted in scheduling mammogram  Follow-up 6 months  A total of 20 minutes was spent in consultation evaluation review

## 2024-04-02 NOTE — PHYSICAL EXAM
[Normocephalic] : normocephalic [Sclera nonicteric] : sclera nonicteric [No Supraclavicular Adenopathy] : no supraclavicular adenopathy [Clear to Auscultation Bilat] : clear to auscultation bilaterally [Soft] : abdomen soft [No Edema] : no edema [No Rashes] : no rashes [No Ulceration] : no ulceration [de-identified] : Stable asymmetry superior left breast, no developing mass noted either breast, no suspicious lymphadenopathy

## 2024-04-02 NOTE — HISTORY OF PRESENT ILLNESS
[FreeTextEntry1] : Patient returns to the office for interval assessment  She feels well  She reports localized pain in the left breast  Denies skin thickening, nipple discharge

## 2024-05-22 ENCOUNTER — APPOINTMENT (OUTPATIENT)
Dept: RADIATION ONCOLOGY | Facility: CLINIC | Age: 49
End: 2024-05-22
Payer: COMMERCIAL

## 2024-05-22 ENCOUNTER — NON-APPOINTMENT (OUTPATIENT)
Age: 49
End: 2024-05-22

## 2024-05-22 VITALS
DIASTOLIC BLOOD PRESSURE: 72 MMHG | BODY MASS INDEX: 23.92 KG/M2 | RESPIRATION RATE: 18 BRPM | WEIGHT: 130 LBS | SYSTOLIC BLOOD PRESSURE: 108 MMHG | HEIGHT: 62 IN | HEART RATE: 70 BPM | OXYGEN SATURATION: 96 %

## 2024-05-22 DIAGNOSIS — C50.912 MALIGNANT NEOPLASM OF UNSPECIFIED SITE OF LEFT FEMALE BREAST: ICD-10-CM

## 2024-05-22 DIAGNOSIS — Z17.0 MALIGNANT NEOPLASM OF UNSPECIFIED SITE OF LEFT FEMALE BREAST: ICD-10-CM

## 2024-05-22 PROCEDURE — 99213 OFFICE O/P EST LOW 20 MIN: CPT

## 2024-05-22 NOTE — PHYSICAL EXAM
[Breast Appearance] : normal in appearance [Symmetric] : breasts are symmetric [Breast Palpation Mass] : no palpable masses [Breast Abnormal Lactation (Galactorrhea)] : no nipple discharge [No UE Edema] : there is no upper extremity edema [Normal] : oriented to person, place and time, the affect was normal, the mood was normal and not anxious [Oriented To Time, Place, And Person] : oriented to person, place, and time [de-identified] : well healed b/l scars sup to areola. 5mm soft nodule sup to L mid scar

## 2024-05-22 NOTE — HISTORY OF PRESENT ILLNESS
[FreeTextEntry1] : The patient is a pleasant 48 year old female diagnosed with early stage left breast referred by Dr. Fontaine.  She had an abnormal routine screening mammogram which revealed heterogeneously dense breast, right breast without suspicious findings. Left breast- group of calcifications in the posterior upper left breast, 7.3 cm FN. Additional mammographic views of the left breast obtained the same day, demonstrate a mixture of calcifications with coarse heterogeneous and pleomorphic morphology, measuring 0.7 x 0.5 x 0.7 cm. 3/28/23 stereotactic biopsy of calcifications in the posterior upper outer left breast. 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  LCIS classic type,  ADH and ALH present.  %, AK 70%positive.  Left partial mastectomy was performed on 5/12/23 with Dr. Fotnaine.  She underwent excision of the tumor at 3:00 as well as the papilloma at 12:00 which was negative.   Pathology left breast 3:00  demonstrated Invasive Ductal Carcinoma Grade 2, measuring 4 mm. Single focus. DCIS present and positive for EIC, size at least 5 mm. Solid and cribriform pattern, Grade II. LVI not identified. All margins negative for IDC and DCIS and > 2mm. ER %, [place] Negative,HER2 negative, Ki-67 1%. [place] lymph node biopsy was performed on 5/26/2023 and 1 sentinel lymph node was negative.  [employer] negative on 4/12/23 .She works full time as a  in [place]. She has 2 sons ages 18 and 16. 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  She plans to start tamoxifen with Dr. Hopper following completion of radiotherapy since her bone density showed osteopenia. She completed RT to the left breast with 5240 cGy and 20 fractions on 8/8/23.  She presents for 1 month PTE. She feels well. Using Rejuvaskin. Reports some anxiety resulting in poor sleep for which she was certified for medical cannabis. She enjoyed reflexology in our office while on treatment. Reports mild occasional left breast discomfort- positional. She discussed AI treatment with Dr. Cheung and Tara and decided against Anastrozole due to Hx of osteopenia or Tamoxifen due to side effects it might produce.   5/22/24 Patient presents for 9 month f/u. Mammogram 3/27/24 showed no evidence of malignancy. She opted not to take and AI or Tamoxifen due to concerns of side effects. She saw Zhen Guerra in March due to concerns about left breast new small lump, sono done, no concerning findings. Likely fat necrosis and no intervention advised. She continues to work and exercises.

## 2024-07-30 NOTE — ASU PATIENT PROFILE, ADULT - NS PRO MODE OF ARRIVAL
Please use erythromycin ointment twice daily    Please follow-up with optometry    Avoid rubbing or touching your eye    Come back for new or worsening concerns   ambulatory

## 2024-10-14 ENCOUNTER — APPOINTMENT (OUTPATIENT)
Dept: FAMILY MEDICINE | Facility: CLINIC | Age: 49
End: 2024-10-14
Payer: COMMERCIAL

## 2024-10-14 VITALS
TEMPERATURE: 98 F | WEIGHT: 130 LBS | OXYGEN SATURATION: 96 % | HEIGHT: 62 IN | DIASTOLIC BLOOD PRESSURE: 70 MMHG | SYSTOLIC BLOOD PRESSURE: 110 MMHG | HEART RATE: 76 BPM | BODY MASS INDEX: 23.92 KG/M2

## 2024-10-14 DIAGNOSIS — J06.9 ACUTE UPPER RESPIRATORY INFECTION, UNSPECIFIED: ICD-10-CM

## 2024-10-14 PROCEDURE — 99213 OFFICE O/P EST LOW 20 MIN: CPT

## 2024-10-21 PROBLEM — J06.9 ACUTE URI: Status: ACTIVE | Noted: 2024-10-21 | Resolved: 2024-11-20

## 2024-12-18 ENCOUNTER — APPOINTMENT (OUTPATIENT)
Dept: BREAST CENTER | Facility: CLINIC | Age: 49
End: 2024-12-18
Payer: COMMERCIAL

## 2024-12-18 VITALS — HEIGHT: 62 IN | WEIGHT: 130 LBS | BODY MASS INDEX: 23.92 KG/M2

## 2024-12-18 DIAGNOSIS — N64.89 OTHER SPECIFIED DISORDERS OF BREAST: ICD-10-CM

## 2024-12-18 PROCEDURE — 76641 ULTRASOUND BREAST COMPLETE: CPT | Mod: 50

## 2024-12-18 PROCEDURE — 99213 OFFICE O/P EST LOW 20 MIN: CPT | Mod: 25

## 2024-12-31 ENCOUNTER — NON-APPOINTMENT (OUTPATIENT)
Age: 49
End: 2024-12-31

## 2025-02-18 ENCOUNTER — APPOINTMENT (OUTPATIENT)
Dept: ORTHOPEDIC SURGERY | Facility: CLINIC | Age: 50
End: 2025-02-18
Payer: COMMERCIAL

## 2025-02-18 VITALS — HEIGHT: 62 IN | WEIGHT: 130 LBS | BODY MASS INDEX: 23.92 KG/M2

## 2025-02-18 DIAGNOSIS — M19.011 PRIMARY OSTEOARTHRITIS, RIGHT SHOULDER: ICD-10-CM

## 2025-02-18 DIAGNOSIS — M75.41 IMPINGEMENT SYNDROME OF RIGHT SHOULDER: ICD-10-CM

## 2025-02-18 PROCEDURE — 20611 DRAIN/INJ JOINT/BURSA W/US: CPT | Mod: RT

## 2025-02-18 PROCEDURE — 99204 OFFICE O/P NEW MOD 45 MIN: CPT | Mod: 25

## 2025-02-18 PROCEDURE — 73030 X-RAY EXAM OF SHOULDER: CPT | Mod: RT

## 2025-02-18 PROCEDURE — 73010 X-RAY EXAM OF SHOULDER BLADE: CPT | Mod: RT

## 2025-02-18 PROCEDURE — J3490M: CUSTOM

## 2025-04-01 ENCOUNTER — APPOINTMENT (OUTPATIENT)
Dept: ORTHOPEDIC SURGERY | Facility: CLINIC | Age: 50
End: 2025-04-01
Payer: COMMERCIAL

## 2025-04-01 DIAGNOSIS — M75.41 IMPINGEMENT SYNDROME OF RIGHT SHOULDER: ICD-10-CM

## 2025-04-01 PROCEDURE — 99214 OFFICE O/P EST MOD 30 MIN: CPT

## 2025-04-01 RX ORDER — DICLOFENAC SODIUM 75 MG/1
75 TABLET, DELAYED RELEASE ORAL TWICE DAILY
Qty: 60 | Refills: 0 | Status: ACTIVE | COMMUNITY
Start: 2025-04-01 | End: 1900-01-01

## 2025-04-04 ENCOUNTER — RESULT REVIEW (OUTPATIENT)
Age: 50
End: 2025-04-04

## 2025-04-14 PROBLEM — M75.111 INCOMPLETE TEAR OF RIGHT ROTATOR CUFF, UNSPECIFIED WHETHER TRAUMATIC: Status: ACTIVE | Noted: 2025-04-14

## 2025-04-15 ENCOUNTER — APPOINTMENT (OUTPATIENT)
Dept: ORTHOPEDIC SURGERY | Facility: CLINIC | Age: 50
End: 2025-04-15
Payer: COMMERCIAL

## 2025-04-15 DIAGNOSIS — M75.41 IMPINGEMENT SYNDROME OF RIGHT SHOULDER: ICD-10-CM

## 2025-04-15 DIAGNOSIS — M75.111 INCOMPLETE ROTATOR CUFF TEAR OR RUPTURE OF RIGHT SHOULDER, NOT SPECIFIED AS TRAUMATIC: ICD-10-CM

## 2025-04-15 PROCEDURE — 99214 OFFICE O/P EST MOD 30 MIN: CPT

## 2025-05-05 ENCOUNTER — APPOINTMENT (OUTPATIENT)
Dept: ORTHOPEDIC SURGERY | Facility: CLINIC | Age: 50
End: 2025-05-05
Payer: OTHER MISCELLANEOUS

## 2025-05-05 VITALS — BODY MASS INDEX: 23.92 KG/M2 | WEIGHT: 130 LBS | HEIGHT: 62 IN

## 2025-05-05 DIAGNOSIS — S99.922A UNSPECIFIED INJURY OF LEFT FOOT, INITIAL ENCOUNTER: ICD-10-CM

## 2025-05-05 DIAGNOSIS — M79.672 PAIN IN LEFT FOOT: ICD-10-CM

## 2025-05-05 DIAGNOSIS — S92.352G DISPLACED FRACTURE OF FIFTH METATARSAL BONE, LEFT FOOT, SUBSEQUENT ENCOUNTER FOR FRACTURE WITH DELAYED HEALING: ICD-10-CM
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
## 2025-05-05 PROCEDURE — 99214 OFFICE O/P EST MOD 30 MIN: CPT

## 2025-05-05 PROCEDURE — 73630 X-RAY EXAM OF FOOT: CPT | Mod: LT

## 2025-05-19 ENCOUNTER — NON-APPOINTMENT (OUTPATIENT)
Age: 50
End: 2025-05-19

## 2025-05-19 ENCOUNTER — APPOINTMENT (OUTPATIENT)
Dept: BREAST CENTER | Facility: CLINIC | Age: 50
End: 2025-05-19

## 2025-05-19 VITALS — WEIGHT: 130 LBS | HEIGHT: 62 IN | BODY MASS INDEX: 23.92 KG/M2

## 2025-05-19 DIAGNOSIS — N64.89 OTHER SPECIFIED DISORDERS OF BREAST: ICD-10-CM

## 2025-05-19 PROCEDURE — 76641 ULTRASOUND BREAST COMPLETE: CPT | Mod: 50

## 2025-05-19 PROCEDURE — 99213 OFFICE O/P EST LOW 20 MIN: CPT | Mod: 25

## 2025-06-16 ENCOUNTER — APPOINTMENT (OUTPATIENT)
Dept: ORTHOPEDIC SURGERY | Facility: CLINIC | Age: 50
End: 2025-06-16
Payer: OTHER MISCELLANEOUS

## 2025-06-16 PROCEDURE — 99214 OFFICE O/P EST MOD 30 MIN: CPT
